# Patient Record
Sex: MALE | Race: ASIAN | NOT HISPANIC OR LATINO | Employment: FULL TIME | URBAN - METROPOLITAN AREA
[De-identification: names, ages, dates, MRNs, and addresses within clinical notes are randomized per-mention and may not be internally consistent; named-entity substitution may affect disease eponyms.]

---

## 2017-11-27 ENCOUNTER — OFFICE VISIT - HEALTHEAST (OUTPATIENT)
Dept: FAMILY MEDICINE | Facility: CLINIC | Age: 37
End: 2017-11-27

## 2017-11-27 DIAGNOSIS — Z23 NEED FOR INFLUENZA VACCINATION: ICD-10-CM

## 2017-11-27 DIAGNOSIS — M54.9 BACKACHE: ICD-10-CM

## 2017-11-27 ASSESSMENT — MIFFLIN-ST. JEOR: SCORE: 1527.98

## 2018-06-05 ENCOUNTER — OFFICE VISIT - HEALTHEAST (OUTPATIENT)
Dept: FAMILY MEDICINE | Facility: CLINIC | Age: 38
End: 2018-06-05

## 2018-06-05 DIAGNOSIS — R09.82 POST-NASAL DRIP: ICD-10-CM

## 2018-06-05 DIAGNOSIS — R05.9 COUGH: ICD-10-CM

## 2018-06-05 ASSESSMENT — MIFFLIN-ST. JEOR: SCORE: 1532.47

## 2020-09-27 ENCOUNTER — COMMUNICATION - HEALTHEAST (OUTPATIENT)
Dept: SCHEDULING | Facility: CLINIC | Age: 40
End: 2020-09-27

## 2020-09-28 ENCOUNTER — COMMUNICATION - HEALTHEAST (OUTPATIENT)
Dept: FAMILY MEDICINE | Facility: CLINIC | Age: 40
End: 2020-09-28

## 2020-10-13 ENCOUNTER — OFFICE VISIT - HEALTHEAST (OUTPATIENT)
Dept: FAMILY MEDICINE | Facility: CLINIC | Age: 40
End: 2020-10-13

## 2020-10-13 DIAGNOSIS — N45.3 EPIDIDYMOORCHITIS: ICD-10-CM

## 2020-10-13 ASSESSMENT — MIFFLIN-ST. JEOR: SCORE: 1556.83

## 2020-10-14 ENCOUNTER — COMMUNICATION - HEALTHEAST (OUTPATIENT)
Dept: NURSING | Facility: CLINIC | Age: 40
End: 2020-10-14

## 2020-10-15 ENCOUNTER — COMMUNICATION - HEALTHEAST (OUTPATIENT)
Dept: CARE COORDINATION | Facility: CLINIC | Age: 40
End: 2020-10-15

## 2020-10-19 ENCOUNTER — COMMUNICATION - HEALTHEAST (OUTPATIENT)
Dept: NURSING | Facility: CLINIC | Age: 40
End: 2020-10-19

## 2020-10-19 ASSESSMENT — ACTIVITIES OF DAILY LIVING (ADL): DEPENDENT_IADLS:: INDEPENDENT

## 2020-10-23 ENCOUNTER — COMMUNICATION - HEALTHEAST (OUTPATIENT)
Dept: CARE COORDINATION | Facility: CLINIC | Age: 40
End: 2020-10-23

## 2020-11-04 ENCOUNTER — COMMUNICATION - HEALTHEAST (OUTPATIENT)
Dept: CARE COORDINATION | Facility: CLINIC | Age: 40
End: 2020-11-04

## 2020-11-18 ENCOUNTER — COMMUNICATION - HEALTHEAST (OUTPATIENT)
Dept: NURSING | Facility: CLINIC | Age: 40
End: 2020-11-18

## 2020-11-30 ENCOUNTER — OFFICE VISIT - HEALTHEAST (OUTPATIENT)
Dept: FAMILY MEDICINE | Facility: CLINIC | Age: 40
End: 2020-11-30

## 2020-11-30 DIAGNOSIS — Z00.00 ROUTINE GENERAL MEDICAL EXAMINATION AT A HEALTH CARE FACILITY: ICD-10-CM

## 2020-11-30 LAB — HBA1C MFR BLD: 5.2 %

## 2020-11-30 ASSESSMENT — MIFFLIN-ST. JEOR: SCORE: 1581.57

## 2020-12-01 LAB
HBV SURFACE AG SERPL QL IA: NEGATIVE
HIV 1+2 AB+HIV1 P24 AG SERPL QL IA: NEGATIVE
LDLC SERPL CALC-MCNC: 153 MG/DL

## 2020-12-02 ENCOUNTER — COMMUNICATION - HEALTHEAST (OUTPATIENT)
Dept: FAMILY MEDICINE | Facility: CLINIC | Age: 40
End: 2020-12-02

## 2020-12-02 LAB — HCV AB SERPL QL IA: NEGATIVE

## 2020-12-15 ENCOUNTER — COMMUNICATION - HEALTHEAST (OUTPATIENT)
Dept: NURSING | Facility: CLINIC | Age: 40
End: 2020-12-15

## 2020-12-17 ENCOUNTER — COMMUNICATION - HEALTHEAST (OUTPATIENT)
Dept: CARE COORDINATION | Facility: CLINIC | Age: 40
End: 2020-12-17

## 2020-12-28 ENCOUNTER — COMMUNICATION - HEALTHEAST (OUTPATIENT)
Dept: CARE COORDINATION | Facility: CLINIC | Age: 40
End: 2020-12-28

## 2021-01-06 ENCOUNTER — COMMUNICATION - HEALTHEAST (OUTPATIENT)
Dept: CARE COORDINATION | Facility: CLINIC | Age: 41
End: 2021-01-06

## 2021-01-13 ENCOUNTER — COMMUNICATION - HEALTHEAST (OUTPATIENT)
Dept: CARE COORDINATION | Facility: CLINIC | Age: 41
End: 2021-01-13

## 2021-01-14 ENCOUNTER — COMMUNICATION - HEALTHEAST (OUTPATIENT)
Dept: NURSING | Facility: CLINIC | Age: 41
End: 2021-01-14

## 2021-01-15 ENCOUNTER — COMMUNICATION - HEALTHEAST (OUTPATIENT)
Dept: CARE COORDINATION | Facility: CLINIC | Age: 41
End: 2021-01-15

## 2021-01-15 DIAGNOSIS — N39.0 E. COLI UTI: ICD-10-CM

## 2021-01-15 DIAGNOSIS — N20.0 CALCULUS OF KIDNEY: ICD-10-CM

## 2021-01-15 DIAGNOSIS — N45.3 EPIDIDYMOORCHITIS: ICD-10-CM

## 2021-01-15 DIAGNOSIS — B96.20 E. COLI UTI: ICD-10-CM

## 2021-01-15 DIAGNOSIS — D72.829 LEUKOCYTOSIS (LEUCOCYTOSIS): ICD-10-CM

## 2021-03-15 ENCOUNTER — COMMUNICATION - HEALTHEAST (OUTPATIENT)
Dept: CARE COORDINATION | Facility: CLINIC | Age: 41
End: 2021-03-15

## 2021-03-15 ENCOUNTER — COMMUNICATION - HEALTHEAST (OUTPATIENT)
Dept: NURSING | Facility: CLINIC | Age: 41
End: 2021-03-15

## 2021-03-15 DIAGNOSIS — D72.829 LEUKOCYTOSIS (LEUCOCYTOSIS): ICD-10-CM

## 2021-03-15 DIAGNOSIS — B96.20 E. COLI UTI: ICD-10-CM

## 2021-03-15 DIAGNOSIS — N20.0 CALCULUS OF KIDNEY: ICD-10-CM

## 2021-03-15 DIAGNOSIS — N45.3 EPIDIDYMOORCHITIS: ICD-10-CM

## 2021-03-15 DIAGNOSIS — N39.0 E. COLI UTI: ICD-10-CM

## 2021-05-31 VITALS — WEIGHT: 141.75 LBS | HEIGHT: 68 IN | BODY MASS INDEX: 21.48 KG/M2

## 2021-06-01 VITALS — WEIGHT: 143.5 LBS | HEIGHT: 68 IN | BODY MASS INDEX: 21.75 KG/M2

## 2021-06-05 VITALS
HEART RATE: 90 BPM | TEMPERATURE: 98.5 F | RESPIRATION RATE: 14 BRPM | SYSTOLIC BLOOD PRESSURE: 120 MMHG | DIASTOLIC BLOOD PRESSURE: 76 MMHG | BODY MASS INDEX: 22.55 KG/M2 | WEIGHT: 148.8 LBS | HEIGHT: 68 IN | OXYGEN SATURATION: 95 %

## 2021-06-05 VITALS
SYSTOLIC BLOOD PRESSURE: 118 MMHG | OXYGEN SATURATION: 99 % | HEART RATE: 89 BPM | TEMPERATURE: 98.2 F | BODY MASS INDEX: 22.96 KG/M2 | WEIGHT: 151.5 LBS | HEIGHT: 68 IN | DIASTOLIC BLOOD PRESSURE: 66 MMHG

## 2021-06-12 NOTE — PROGRESS NOTES
ASSESMENT AND PLAN:  Diagnoses and all orders for this visit:    Epididymoorchitis  Reviewed the discharge summary with the patient with the help of a professional .  He has completed his full course of antibiotics and has had almost complete resolution of his symptoms.  Counseled the patient that if he does not have complete resolution of the remaining low-grade testicular and abdominal pain that it should be rechecked with me here in the clinic in 3 to 4 weeks.  Patient in agreement with the plan, because of the concern about an insurance and his recent hospitalization and confusion about insurance coverage as detailed below I would like to have him be referred to our care management team to have assistance from a financial .  -     Ambulatory referral to Care Management (Primary Care)        Reviewed the risks and benefits of the treatment plan with the patient and/or caregiver and we discussed indications for routine and emergent follow-up.        SUBJECTIVE: 40-year-old male hospitalized recently and diagnosed with epididymal orchitis of the left side.  Labs showed urine culture positive for E. coli, gonorrhea and Chlamydia testing was negative.  Patient was treated with aggressive antibiotics initially with IV therapy and then transition to oral and discharged home.  Since discharge, he has not had any vomiting.  He had some subjective fever the first 2 days after discharge but this has subsequently resolved.  His pain has continued to improve and he reports there is been about a 90% reduction in pain in his left testicle and in his abdomen.  Patient reports that there have been recent problems with health insurance and apparently he does not currently have active health insurance.    No past medical history on file.  Patient Active Problem List   Diagnosis     h/o kidney stone     Epididymoorchitis     E. coli UTI     Leukocytosis (leucocytosis)     Current Outpatient Medications  "  Medication Sig Dispense Refill     acetaminophen (TYLENOL) 500 MG tablet Take 1,000 mg by mouth every 6 (six) hours as needed for pain.       No current facility-administered medications for this visit.      Social History     Tobacco Use   Smoking Status Former Smoker     Quit date: 2012     Years since quittin.8   Smokeless Tobacco Never Used       OBJECTICE: /76   Pulse 90   Temp 98.5  F (36.9  C) (Oral)   Resp 14   Ht 5' 7.52\" (1.715 m)   Wt 148 lb 12.8 oz (67.5 kg)   SpO2 95%   BMI 22.95 kg/m       No results found for this or any previous visit (from the past 24 hour(s)).     GEN-alert, appropriate, in no apparent distress   CV-regular rate and rhythm with no murmur   RESP-lungs clear to auscultation   ABDOMINAL-soft, diffuse mild tenderness to deep palpation of the lower abdomen.   Genitourinary-penis shows changes consistent with previous silicone injection, otherwise normal external genitalia.  Mildly tender to palpation of the left testicle and epididymis.  No testicular nodules or masses.    Sawyer Hansen          "

## 2021-06-12 NOTE — PROGRESS NOTES
Clinic Care Coordination Contact  Community Health Worker Initial Outreach    CHW Initial Information Gathering:  Referral Source: PCP  Preferred Hospital: [facility]  [phone]  Preferred Urgent Care: [facility], [phone]  Current living arrangement:: I live in a private home with family  Type of residence:: Private home - stairs  Community Resources: None  Supplies Currently Used at Home: None  Equipment Currently Used at Home: none  Informal Support system:: Friends, Family  No PCP office visit in Past Year: No  Transportation means:: Regular car  CHW Additional Questions  MyChart active?: No  Patient agreeable to assistance with activating MyChart?: No    Patient accepts CC: Yes. Patient scheduled for assessment with CCC SW on Monday 10/19 at 1pm. Patient noted desire to discuss other possible concerns besides insurance.     CHW sent Remind Me message to FRW today      FRW SCREENING:    What does the patient need help with?: Health Insurance     Answer the screening questions below      Screening Questions:   1. Have you recently applied recently or are you do for a renewal? If so, when? -  No  2. How many people in your household? -  2 Adults, 4 children  3. Do you file taxes, who do you claim? -   couple  4. What is the monthly gross income for the household (wages, social security, workers comp, and pension)? -  $3,000 per month combined income of Edmundo Fraga and his wife Dori Taylor-its     PMI#: [id number]    Major Programs  This subscriber is eligible for FF: [place].  Elig Type M2: [place] group II  Eligibility Begin Date: 11/01/2019  Eligibility End Date: 12/31/2019    Prepaid Health Plan  This subscriber receives FF01 - [place] delivered through [employer]. The phone numbers is [phone] ().    Any other information for FRW? - He reports he cannot get insurance through his employer. His wife has insurance through her  employer, so unsure if she can add him to her insurance???

## 2021-06-12 NOTE — PROGRESS NOTES
Clinic Care Coordination Contact  Program: Health Insurance   County:  [facility] Case #:  [facility] Worker:   Nguyen #:   PMI #:   Date Applied:   Renewal Month:       Outreach:   10/23/20: FRW and patient called [facility] to update name. Name has been updated and Blue Plus card will be coming in mail. Moving outreach to 2 weeks. FRW updated action steps in financial wellbeing goals.   10/15/20: FRW called patient and discussed referral. Patient has [place] but the name is incorrect on his insurance card. FRW and patient will call [facility] on 10/23 to fix name.   Referral/Screening:   Hi,     Edmundo Fraga would like help applying for health insurance.         What does the patient need help with?: Health Insurance     Answer the screening questions below     Screening Questions:   1. Have you recently applied recently or are you do for a renewal? If so, when? -  No   2. How many people in your household? -  2 Adults, 4 children   3. Do you file taxes, who do you claim? -   couple   4. What is the monthly gross income for the household (wages, social security, workers comp, and pension)? -  $3,000 per month combined income of Edmundo Fraga and his wife Dori Taylor-its     PMI#: [id number]     Major Programs   This subscriber is eligible for FF: [place] [facility].   Elig Type M2: [place] group II   Eligibility Begin Date: 11/01/2019   Eligibility End Date: 12/31/2019     Prepaid Health Plan   This subscriber receives FF01 - [place] delivered through [employer]. The phone numbers is [phone] ([phone]).     Any other information for FRW? - He reports he cannot get insurance through his employer. His wife has insurance through her employer, so unsure if she can add him to her insurance???         Thank you,     Emily       Goals Addressed:   Goals Addressed                 This Visit's Progress       Patient Stated      Financial Wellbeing (pt-stated)   50%     Goal statement: I would like to have active  health insurance and resolve any outstanding account balance within the next 90 days.    Date goal set: 10/19/20  Barriers: Language barrier, lack of health insurance, needs follow up with PCP  Strengths: Appears willing to accept support, engage with CCC team members  Date to achieve by: 1/19/21  Patient expressed understanding of goal: Yes    Action steps to achieve this goal:  1.  I updated my name on 10/23 with [facility]. I will get a call from W in 2 weeks to make sure I receive my card and bills are reprocessed.   2.  I will provide all necessary paperwork/documentation to assist in this process.

## 2021-06-12 NOTE — PROGRESS NOTES
Clinic Care Coordination Contact  Program: Health Insurance   County:  [facility] Case #:  [facility] Worker:   Nguyen #:   PMI #:   Date Applied:   Renewal Month:       Outreach:   12/7/20: FRW received a message from Dr. Hansen that patient was receiving letters from [employer] stating they won't be paying for claims. FRW called patient. Patient is going to call [employer] billing as they have representatives that work directly with [employer] to get claims paid. Patient has number. This is not a FRW task. Removing patient from panel.  11/4/20: FRW called patient. Patient received his insurance card with correct spelling. FRW checked [place], insurance is [place]. Insurance is active in Epic and patient has 0.00 balance. FRW completed financial wellbeing goal and routed note to CC team for standard of work.         This subscriber is eligible for FF: [place].      Elig Type M2: [place] group II      Eligibility Begin Date: 11/01/2020      Eligibility End Date: --/--/----      This subscriber is eligible for the following service types: Medical Care ,  Chiropractic ,  Dental Care ,  Hospital ,  Hospital - Inpatient ,  Hospital - Outpatient ,  Emergency Services ,  Pharmacy ,  Professional (Physician) Visit - Office ,  Vision (Optometry) ,  Mental Health ,  Urgent Care    Prepaid Health Plan        This subscriber receives FF01 - [place] delivered through [employer]. The phone numbers is [phone] ().    10/23/20: FRW and patient called [facility] to update name. Name has been updated and Blue Plus card will be coming in mail. Moving outreach to 2 weeks. FRW updated action steps in financial wellbeing goals.   10/15/20: FRW called patient and discussed referral. Patient has [place] but the name is incorrect on his insurance card. FRW and patient will call [facility] on 10/23 to fix name.   Referral/Screening:   Edmundo Lafleur would like help applying for health insurance.         What does the  patient need help with?: Health Insurance     Answer the screening questions below     Screening Questions:   1. Have you recently applied recently or are you do for a renewal? If so, when? -  No   2. How many people in your household? -  2 Adults, 4 children   3. Do you file taxes, who do you claim? -   couple   4. What is the monthly gross income for the household (wages, social security, workers comp, and pension)? -  $3,000 per month combined income of Edmundo Fraga and his wife Dori Fraga       [place]-its     PMI#: [id number]     Major Programs   This subscriber is eligible for FF: [place].   Elig Type M2: [place] group II   Eligibility Begin Date: 11/01/2019   Eligibility End Date: 12/31/2019     Prepaid Health Plan   This subscriber receives FF01 - [place] delivered through [employer]. The phone numbers is [phone] ().     Any other information for FRW? - He reports he cannot get insurance through his employer. His wife has insurance through her employer, so unsure if she can add him to her insurance???         Thank you,     Emily       Goals Addressed:   Goals Addressed      Goals Addressed                 This Visit's Progress       Patient Stated      COMPLETED: Financial Wellbeing (pt-stated)        Goal statement: I would like to have active health insurance and resolve any outstanding account balance within the next 90 days.    Date goal set: 10/19/20  Barriers: Language barrier, lack of health insurance, needs follow up with PCP  Strengths: Appears willing to accept support, engage with CCC team members  Date to achieve by: 1/19/21  Patient expressed understanding of goal: Yes    Action steps to achieve this goal:  1.  I have received my updated insurance card and have a 0.00 balance with [employer].

## 2021-06-12 NOTE — PROGRESS NOTES
Clinic Care Coordination Contact  Program: Health Insurance   County:  [facility] Case #:  [facility] Worker:   Nguyen #:   PMI #:   Date Applied:   Renewal Month:       Outreach:   10/15/20: FRW called patient and discussed referral. Patient has [place] but the name is incorrect on his insurance card. FRW and patient will call [facility] on 10/23 to fix name.   Referral/Screening:   HiEdmundo would like help applying for health insurance.         What does the patient need help with?: Health Insurance     Answer the screening questions below     Screening Questions:   1. Have you recently applied recently or are you do for a renewal? If so, when? -  No   2. How many people in your household? -  2 Adults, 4 children   3. Do you file taxes, who do you claim? -   couple   4. What is the monthly gross income for the household (wages, social security, workers comp, and pension)? -  $3,000 per month combined income of Edmundo Fraga and his wife Dori Fraga       [place]its     PMI#: [id number]     Major Programs   This subscriber is eligible for FF: [place].   Elig Type M2: [place] group II   Eligibility Begin Date: 11/01/2019   Eligibility End Date: 12/31/2019     Prepaid Health Plan   This subscriber receives FF01 - [place] delivered through [employer]. The phone numbers is [phone] ().     Any other information for FRW? - He reports he cannot get insurance through his employer. His wife has insurance through her employer, so unsure if she can add him to her insurance???         Thank you,     Emily       Goals Addressed:

## 2021-06-13 NOTE — PROGRESS NOTES
Clinic Care Coordination Contact  Program: Write off claims  County:  [facility] Case #:  [facility] Worker:   Nguyen #:   PMI #:   Date Applied:   Renewal Month:       Outreach:   12/17/20: FRW sent Kate Vann in billing to [employer]. FRW will make outreach to patient in 1 week with update.   12/7/20: FRW received a message from Dr. Hansen that patient was receiving letters from [employer] stating they won't be paying for claims. FRW called patient. Patient is going to call [employer] billing as they have representatives that work directly with [employer] to get claims paid. Patient has number. This is not a FRW task. Removing patient from panel.  11/4/20: FRW called patient. Patient received his insurance card with correct spelling. FRW checked [place], insurance is active. Insurance is active in Epic and patient has 0.00 balance. FRW completed financial wellbeing goal and routed note to CC team for standard of work.         This subscriber is eligible for FF: [place].      Elig Type M2: [facility] group [place]      Eligibility Begin Date: 11/01/2020      Eligibility End Date: --/--/----      This subscriber is eligible for the following service types: Medical Care ,  Chiropractic ,  Dental Care ,  Hospital ,  Hospital - Inpatient ,  Hospital - Outpatient ,  Emergency Services ,  Pharmacy ,  Professional (Physician) Visit - Office ,  Vision (Optometry) ,  Mental Health ,  Urgent Care    Prepaid Health Plan        This subscriber receives FF01 - [place] delivered through [employer]. The phone numbers is [phone] ().    10/23/20: FRW and patient called [facility] to update name. Name has been updated and Blue Plus card will be coming in mail. Moving outreach to 2 weeks. FRW updated action steps in financial wellbeing goals.   10/15/20: FRW called patient and discussed referral. Patient has [place] but the name is incorrect on his insurance card. FRW and patient will call [facility] on 10/23 to  fix name.   Referral/Screening:   Edmundo Lafleur would like help applying for health insurance.         What does the patient need help with?: Health Insurance     Answer the screening questions below     Screening Questions:   1. Have you recently applied recently or are you do for a renewal? If so, when? -  No   2. How many people in your household? -  2 Adults, 4 children   3. Do you file taxes, who do you claim? -   couple   4. What is the monthly gross income for the household (wages, social security, workers comp, and pension)? -  $3,000 per month combined income of Edmundo Fraga and his wife Dori Fraga       Mn-its     PMI#: [id number]     Major Programs   This subscriber is eligible for FF: [place].   Elig Type M2: [place] group II   Eligibility Begin Date: 11/01/2019   Eligibility End Date: 12/31/2019     Prepaid Health Plan   This subscriber receives FF01 - [place] delivered through [employer]. The phone numbers is [phone] ().     Any other information for FRW? - He reports he cannot get insurance through his employer. His wife has insurance through her employer, so unsure if she can add him to her insurance???         Thank you,     Emily       Goals Addressed:   Goals Addressed      Goals Addressed                 This Visit's Progress       Patient Stated      COMPLETED: Financial Wellbeing (pt-stated)        Goal statement: I would like to have active health insurance and resolve any outstanding account balance within the next 90 days.    Date goal set: 10/19/20  Barriers: Language barrier, lack of health insurance, needs follow up with PCP  Strengths: Appears willing to accept support, engage with CCC team members  Date to achieve by: 1/19/21  Patient expressed understanding of goal: Yes    Action steps to achieve this goal:  1.  I have received my updated insurance card and have a 0.00 balance with [employer].

## 2021-06-13 NOTE — PROGRESS NOTES
Clinic Care Coordination Contact    Community Health Worker Follow Up    Goals:     Goals Addressed                 This Visit's Progress       Patient Stated      Financial Wellbeing (pt-stated)        Goal Statement: I want help resolving my old balance that occurred before my [place] insurance became effective.    Date Goal set: 12/15/2020  Barriers: Unfamiliar with managing medical bills  Strengths: Engaged and open for help  Date to Achieve By: 01/15/2021  Patient expressed understanding of goal: Yes    Action steps to achieve this goal:  1. I will answer my phone when the FRW calls to address my old balance.  2. I will provide any information needed to help resolve my old account balance.  3. I will call the FRW if I have questions or concerns.          COMPLETED: Medical (pt-stated)   100%     Goal statement: I will schedule and attend a follow up appointment with my PCP within the next 45 days.     Date goal set: 10/19/20  Barriers: Language barrier, lack of health insurance, needs follow up with PCP  Strengths: Appears willing to accept support, engage with CCC team members  Date to achieve by: 12/1/20  Patient expressed understanding of goal: Yes    Personal Plan:  1. I will follow up with my PCP as needed and complete my next annual physical November 2021.                CHW Outreach Conversation:    Edmundo Fraga still has an old balance and per FRW notes it sounded like the FRW could not assist but the CHW contacted the billing department and they state that the patient just needs help writing off the old balance as the claims occurred prior to the [place] insurance becoming active. If for any reason the billing department will not write off this old balance then the patient would like support with a Financial Aid [facility] application to help resolve the balance.    CHW created a new goal and sent a Remind Me message to the FRW to follow up with the billing department on this old  balance as [place] does not back pay.      CHW Next Follow-up: 1 month    CHW Plan: Review FRW notes and account balance for updates

## 2021-06-13 NOTE — PROGRESS NOTES
ASSESMENT AND PLAN:    Physical, Health Maitenence -   Reviewed healthy lifestyle, diet, exercise, vitamins, and follow-up plan today with patient.  Reviewed age appropriate cancer and other screening recommendations.  Immunization review and update done.  Reviewed indicated lab tests, see lab orders.   Financial  to help with concerns as detailed below.      -     HIV Antigen/Antibody Screening [facility]  -     Hepatitis C Antibody (Anti-HCV)  -     Hepatitis B Surface Antigen (HBsAG)  -     Glycosylated Hemoglobin A1c  -     LDL Cholesterol, Direct  -     Tdap vaccine,  8yo or older,  IM          HPI: 40-year-old male here for his annual preventative visit and physical.  No other concerns.  The patient had recently been hospitalized with epididymaorchitis.  He reports that his symptoms have resolved completely.  No abdominal pain, no testicular pain, no dysuria or gross hematuria.  Gonorrhea and Chlamydia testing were negative at that time.  He does get letters from the insurance indicating that they are denying payment for his hospitalization and follow-up care, he is under care management and so I am going to forward those concerns to our financial .  Patient is feeling financial stress related to these letters and hospital/clinic bills.    ROS: No chest pain, no shortness of breath, no blood in the urine, no blood in the stool, no skin lesions that have been changing, remainder of review of systems is as above or negative.    Healthy Habits  Are you taking a daily aspirin? No  Do you typically exercising at least 40 min, 3-4 times per week?  NO  Do you usually eat at least 4 servings of fruit and vegetables a day, include whole grains and fiber and avoid regularly eating high fat foods? Yes  Have you had an eye exam in the past two years? Yes  Do you see a dentist twice per year? NO  Do you have any concerns regarding sleep? No    Safety Screen  If you own firearms, are they secured in  a locked gun cabinet or with trigger locks? Yes  Do you feel you are safe where you are living?: Yes (10/13/2020  4:52 PM)  Do you feel you are safe in your relationship(s)?: Yes (10/13/2020  4:52 PM)        Current Outpatient Medications   Medication Sig Dispense Refill     acetaminophen (TYLENOL) 500 MG tablet Take 1,000 mg by mouth every 6 (six) hours as needed for pain.       No current facility-administered medications for this visit.        Patient Active Problem List   Diagnosis     h/o kidney stone     Epididymoorchitis     E. coli UTI     Leukocytosis (leucocytosis)       Social History     Socioeconomic History     Marital status:      Spouse name: Camilla Fraga     Number of children: 4     Years of education: None     Highest education level: None   Occupational History     None   Social Needs     Financial resource strain: Somewhat hard     Food insecurity     Worry: Never true     Inability: Never true     Transportation needs     Medical: No     Non-medical: No   Tobacco Use     Smoking status: Former Smoker     Quit date: 2012     Years since quittin.0     Smokeless tobacco: Never Used   Substance and Sexual Activity     Alcohol use: Yes     Alcohol/week: 2.0 standard drinks     Types: 2 Cans of beer per week     Frequency: 2-4 times a month     Drinks per session: 1 or 2     Binge frequency: Never     Drug use: No     Sexual activity: Yes     Partners: Female   Lifestyle     Physical activity     Days per week: 5 days     Minutes per session: 30 min     Stress: Only a little   Relationships     Social connections     Talks on phone: More than three times a week     Gets together: More than three times a week     Attends [employer] service: More than 4 times per year     Active member of club or organization: No     Attends meetings of clubs or organizations: Never     Relationship status:      Intimate partner violence     Fear of current or ex partner: No     Emotionally abused: No  "    Physically abused: No     Forced sexual activity: No   Other Topics Concern     None   Social History Narrative     None       Social History     Tobacco Use   Smoking Status Former Smoker     Quit date: 2012     Years since quittin.0   Smokeless Tobacco Never Used       OBJECTICE: /66 (Patient Site: Right Arm, Patient Position: Sitting, Cuff Size: Adult Regular)   Pulse 89   Temp 98.2  F (36.8  C) (Oral)   Ht 5' 8.31\" (1.735 m)   Wt 151 lb 8 oz (68.7 kg)   SpO2 99%   BMI 22.83 kg/m        Gen - alert, orientated, NAD  Eyes - fundascopic exam limited by the undialated pupil but looks symmetric  ENT - oropharynx clear, TMs clear  Neck - supple, no palpable mass or lymphadenopathy  CV - RRR, no murmur  Resp - lungs CTA  Ab - soft, nontender, no palpable mass or organomegaly   - normal appearance to the external genetalia except for sequelae from previous penile injection, normal testicular exam bilaterally, no hernia  Extrem - warm, no edema  Neuro - CN II-XII intact, strength, sensation, reflexes intact and symmetric  Skin - no rash, no atypical appearing lesions seen.           Sawyer Hansen   7:05 PM 2020                 "

## 2021-06-13 NOTE — PROGRESS NOTES
Clinic Care Coordination Contact    Community Health Worker Follow Up    Goals:     Goals Addressed                 This Visit's Progress       Patient Stated      Medical (pt-stated)   70%     Goal statement: I will schedule and attend a follow up appointment with my PCP within the next 45 days.     Date goal set: 10/19/20  Barriers: Language barrier, lack of health insurance, needs follow up with PCP  Strengths: Appears willing to accept support, engage with CCC team members  Date to achieve by: 12/1/20  Patient expressed understanding of goal: Yes    Action steps to achieve this goal:  1.  I will attend my Annual Physical on Monday 11/30 at 5:20pm.  2.  I will call my CHW if I need to reschedule this appointment.                CHW Outreach Conversation:    Edmundo Lay reports that he is aware his health insurance is active and is okay to schedule an Annual Physical with Dr. Hansen.      Possible Care Gaps to Address:    Annual Physical      CHW Next Follow-up: 12/16/2020    CHW Plan: Review Annual Physical office visit notes. Create new goals if needed.

## 2021-06-14 NOTE — PROGRESS NOTES
Clinic Care Coordination Contact    Community Health Worker Follow Up    Goals:     Goals Addressed                 This Visit's Progress       Patient Stated      COMPLETED: Financial Wellbeing (pt-stated)   100%     Goal Statement: I would like assistance resolving my old balance that occurred before my [employer] insurance became effective within the next 60 days.    Date Goal set: 12/15/20  Barriers: Unfamiliar with managing medical bills  Strengths: Engaged and open for help  Date to Achieve By: 2/15/21  Patient expressed understanding of goal: Yes    Personal Plan:  1. My account balance is resolved and I will contact the billing department at [phone] if I receive a bill in the mail that I have questions about.                CHW Outreach Conversation:    Edmundo Fraga has no account balance currently and his insurance is still showing as active with [employer]. He understands he will have copays and is responsible for paying these.    No new concerns at this time and is okay with moving to Maintenance.      CHW Next Follow-up: 2 months    CHW Plan: Routing to [facility] to review for Maintenance; CHW will outreach in 2 months to review for Graduation.

## 2021-06-14 NOTE — PROGRESS NOTES
Clinic Care Coordination Contact  Program: Write off claims  County:  [facility] Case #:  [facility] Worker:   Nguyen #:   PMI #:   Date Applied:   Renewal Month:       Outreach:   12/28/20: FRW re-sent Kate Vann in billing to W/O balance. FRW will make outreach to patient in 1 week with update.     12/17/20: FRW sent Kate Vann in billing to W/O balance. FRW will make outreach to patient in 1 week with update.   12/7/20: FRW received a message from Dr. Hansen that patient was receiving letters from [employer] stating they won't be paying for claims. FRW called patient. Patient is going to call [employer] billing as they have representatives that work directly with [employer] to get claims paid. Patient has number. This is not a FRW task. Removing patient from panel.  11/4/20: FRW called patient. Patient received his insurance card with correct spelling. FRW checked [place], [place] is [place]. Insurance is active in Epic and patient has 0.00 balance. FRW completed financial wellbeing goal and routed note to CC team for standard of work.         This subscriber is eligible for FF: [place] [facility].      Elig Type M2: [place] group II      Eligibility Begin Date: 11/01/2020      Eligibility End Date: --/--/----      This subscriber is eligible for the following service types: Medical Care ,  Chiropractic ,  Dental Care ,  Hospital ,  Hospital - Inpatient ,  Hospital - Outpatient ,  Emergency Services ,  Pharmacy ,  Professional (Physician) Visit - Office ,  Vision (Optometry) ,  Mental Health ,  Urgent Care    Prepaid Health Plan        This subscriber receives FF01 - [place] delivered through [employer]. The phone numbers is [phone] ().    10/23/20: FRW and patient called [facility] to update name. Name has been updated and Blue Plus card will be coming in mail. Moving outreach to 2 weeks. FRW updated action steps in financial wellbeing goals.   10/15/20: FRW called patient and discussed  referral. Patient has [place] but the name is incorrect on his insurance card. FRW and patient will call [facility] on 10/23 to fix name.   Referral/Screening:   HiEdmundo would like help applying for health insurance.         What does the patient need help with?: Health Insurance     Answer the screening questions below     Screening Questions:   1. Have you recently applied recently or are you do for a renewal? If so, when? -  No   2. How many people in your household? -  2 Adults, 4 children   3. Do you file taxes, who do you claim? -   couple   4. What is the monthly gross income for the household (wages, social security, workers comp, and pension)? -  $3,000 per month combined income of Edmundo Fraga and his wife Dori Fraga       [place]     PMI#: [id number]     Major Programs   This subscriber is eligible for FF: [place] [facility].   Elig Type M2: [place] group II   Eligibility Begin Date: 11/01/2019   Eligibility End Date: 12/31/2019     Prepaid Health Plan   This subscriber receives FF01 - [place] delivered through [employer]. The phone numbers is [phone] ().     Any other information for FRW? - He reports he cannot get insurance through his employer. His wife has insurance through her employer, so unsure if she can add him to her insurance???         Thank you,     Emily       Goals Addressed:   Goals Addressed      Goals Addressed                 This Visit's Progress       Patient Stated      COMPLETED: Financial Wellbeing (pt-stated)        Goal statement: I would like to have active health insurance and resolve any outstanding account balance within the next 90 days.    Date goal set: 10/19/20  Barriers: Language barrier, lack of health insurance, needs follow up with PCP  Strengths: Appears willing to accept support, engage with CCC team members  Date to achieve by: 1/19/21  Patient expressed understanding of goal: Yes    Action steps to achieve this goal:  1.  I have received my  updated insurance card and have a 0.00 balance with [employer].

## 2021-06-14 NOTE — PROGRESS NOTES
Clinic Care Coordination Contact     Situation: Pt chart reviewed by SW care coordinator.     Background:   - Most recent SW assessment completed on 10/19/20.  - Pt initially enrolled for FRW assistance due to lack of health insurance.   - See SW note dated 1/13/21 for further detail regarding course of CCC enrollment.  - Last outreach by CHW on 1/14/21. During this conversation, no new goals or concerns were identified. Pt's account balance is now $0. He has active insurance with [employer]. He understands he will have copays and is responsible for paying these.     Assessment: All previous goals completed. Enrollment status adjusted.      Plan/Recommendations:   1.) Pt will be transitioned to CCC maintenance at this time.   - If no new goals/concerns identified at next outreach in 2 months (on/around 3/15/21), CHW will route chart to CCC SW for graduation review.

## 2021-06-14 NOTE — PROGRESS NOTES
Clinic Care Coordination Contact  Program: Write off claims  County:  [facility] Case #:  [facility] Worker:   Nguyen #:   PMI #:   Date Applied:   Renewal Month:       Outreach:   1/6/21: FRW spoke with Seble Celestin in billing. Seble stated that all bills are with insurance now and are still being processed. FRW has no self-pay balance. If patient has self-pay balance in the future he can call the billing department at [employer]. FRW called patient to let him know insurance is processing but unable to leave . Routing note to CC team for standard of work.   12/28/20: FRW re-sent Kate Vann in billing to W/O balance. FRW will make outreach to patient in 1 week with update.     12/17/20: FRW sent Kate Vann in billing to W/O balance. FRW will make outreach to patient in 1 week with update.   12/7/20: FRW received a message from Dr. Hansen that patient was receiving letters from [employer] stating they won't be paying for claims. FRW called patient. Patient is going to call [employer] billing as they have representatives that work directly with [employer] to get claims paid. Patient has number. This is not a FRW task. Removing patient from panel.  11/4/20: FRW called patient. Patient received his insurance card with correct spelling. FRW checked [place], insurance is active. Insurance is active in Epic and patient has 0.00 balance. FRW completed financial wellbeing goal and routed note to CC team for standard of work.         This subscriber is eligible for FF: [facility].      Elig Type M2: [facility] group [facility]      Eligibility Begin Date: 11/01/2020      Eligibility End Date: --/--/----      This subscriber is eligible for the following service types: Medical Care ,  Chiropractic ,  Dental Care ,  Hospital ,  Hospital - Inpatient ,  Hospital - Outpatient ,  Emergency Services ,  Pharmacy ,  Professional (Physician) Visit - Office ,  Vision (Optometry) ,  Mental Health ,  Urgent Care    Prepaid Health Plan         This subscriber receives FF01 - [place] delivered through [employer]. The phone numbers is [phone] ().    10/23/20: FRW and patient called [facility] to update name. Name has been updated and Blue Plus card will be coming in mail. Moving outreach to 2 weeks. FRW updated action steps in financial wellbeing goals.   10/15/20: FRW called patient and discussed referral. Patient has [place] but the name is incorrect on his insurance card. FRW and patient will call [facility] on 10/23 to fix name.   Referral/Screening:   Hi,     Melindasirisha Fraga would like help applying for health insurance.         What does the patient need help with?: Health Insurance     Answer the screening questions below     Screening Questions:   1. Have you recently applied recently or are you do for a renewal? If so, when? -  No   2. How many people in your household? -  2 Adults, 4 children   3. Do you file taxes, who do you claim? -   couple   4. What is the monthly gross income for the household (wages, social security, workers comp, and pension)? -  $3,000 per month combined income of Edmundo Fraga and his wife Dori Fraga       [place]     PMI#: [id number]     Major Programs   This subscriber is eligible for FF: [place] [facility].   Elig Type M2: [place] group II   Eligibility Begin Date: 11/01/2019   Eligibility End Date: 12/31/2019     Prepaid Health Plan   This subscriber receives FF01 - [place] delivered through [employer]. The phone numbers is [phone] ().     Any other information for FRW? - He reports he cannot get insurance through his employer. His wife has insurance through her employer, so unsure if she can add him to her insurance???         Thank you,     Emily       Goals Addressed:   Goals Addressed      Goals Addressed                 This Visit's Progress       Patient Stated      COMPLETED: Financial Wellbeing (pt-stated)        Goal statement: I would like to have active health insurance and  resolve any outstanding account balance within the next 90 days.    Date goal set: 10/19/20  Barriers: Language barrier, lack of health insurance, needs follow up with PCP  Strengths: Appears willing to accept support, engage with CCC team members  Date to achieve by: 1/19/21  Patient expressed understanding of goal: Yes    Action steps to achieve this goal:  1.  I have received my updated insurance card and have a 0.00 balance with [employer].

## 2021-06-14 NOTE — PROGRESS NOTES
"  Chief Complaint   Patient presents with     Back Pain         HPI:   Edmundo Fraga is a 37 y.o. male .with  c/o back pain since yesterday.  Occurred after carried an amplifier.  All the way across his back.  No radiation into legs.  Has had some back pain in the past.  Works as .  Missed work due to the back pain.  No fever but has felt a little chilled.  No medication taken.  Used some tiger balm.  No numbness in legs.  No urinary retention.  No fecal incontinence.    ROS:  A 10 point comprehensive review of systems was negative except as noted.     Medications:  Current Outpatient Prescriptions on File Prior to Visit   Medication Sig Dispense Refill     omeprazole (PRILOSEC) 20 MG capsule Take 1 capsule (20 mg total) by mouth daily. 30 capsule 11     No current facility-administered medications on file prior to visit.          Social History:  Social History   Substance Use Topics     Smoking status: Former Smoker     Quit date: 11/23/2012     Smokeless tobacco: Never Used     Alcohol use Yes      Comment: sometimes only         Physical Exam:   Vitals:    11/27/17 1916   BP: 106/78   Patient Site: Right Arm   Patient Position: Sitting   Cuff Size: Adult Regular   Pulse: 84   Temp: 98.8  F (37.1  C)   TempSrc: Oral   Weight: 141 lb 12 oz (64.3 kg)   Height: 5' 7.72\" (1.72 m)       GEN:  NAD  LUNGS:  Clear to auscultation without wheezing.  Normal effort.  HEART:  RRR without murmur, rub or gallop  BACK:  No pain to percussion over LS spine.  No tenderness to palpation over muscles.  NEURO:  DTR's: Patellar  L 2/4  R 2/4                                Achilles  L  2/4  R 2/4                  Motor:  Great Toe Flexion L 5/5  R 5/5                                                  Extension L 5/5  R 5/5                              Ankle Flexion L 5/5  R 5/5                                        Extension L 5/5  R 5/5                              Knee Flexion  L 5/5  R 5/5                              "           Extension  L 5/5  R 5/5                              Hip Abduction  L 5/5  R 5/5                                    Adduction   L 5/5  R 5/5                              Hip Flexion L 5/5  R 5/5                                     Extension L 5/5  R 5/5                  Sensation intact to light touch throughout both LE                   Straight leg raising negative BL          Assessment/Plan:    1. Backache  cyclobenzaprine (FLEXERIL) 5 MG tablet    naproxen (NAPROSYN) 500 MG tablet   2. Need for influenza vaccination  Influenza, Seasonal Quad, Preservative Free 36+ Months      Low back pain--without neurological signs or symptoms.  Likely muscle strain.  Medications as ordered.  Ice 10-15 minutes at least three times a day.  Stretching and ROM exercises.  Discussed warning signs.  F/U if no improvement.    Note given to be off work 11/27-11/29/17        The following portions of the patient's history were reviewed and updated as appropriate: allergies, current medications, past family history, past medical history, past social history, past surgical history and problem list.    Aida Dickerson MD      11/27/2017

## 2021-06-14 NOTE — PROGRESS NOTES
"Clinic Care Coordination Contact    Situation: 45 day chart review completed by SW care coordinator.    Background:   - Most recent  assessment completed on 10/19/20.  - Pt initially enrolled for FRW assistance due to lack of health insurance.   - CHW contacted billing department on 12/15/20 to help pt resolve outstanding account balance and sent FRW a Remind Me message requesting further assistance with having this written of and/or completing a [facility] application.   - FRW note in chart dated 1/6/21 states: \"FRW spoke with Seble Celestin in billing. Seble stated that all bills are with insurance now and are still being processed. FRW has no self-pay balance. If patient has self-pay balance in the future he can call the billing department at [facility]. FRW called patient to let him know insurance is processing but unable to leave . Routing note to CC team for standard of work.\"  - Pt was last seen by PCP for an annual physical on 11/30/20.  - Last outreach by CHW on 12/15/20. CHW will review account balance again at next outreach and route for maintenance review if appropriate.    Assessment: All goals reviewed and updated during this encounter. CCC SW will continue monitoring goal(s) and progress made towards goal completion. Chart review to be completed by SW every 45 days during continued course of CCC enrollment.     Plan/Recommendations:   1.) Continue scheduled outreach.  "

## 2021-06-15 NOTE — PROGRESS NOTES
Clinic Care Coordination Contact     Situation: Pt chart reviewed by  care coordinator.     Background:   - Most recent  assessment completed on 10/19/20.  - Moved to [facility] [place] on 1/15/21. See  note from this date for further detail regarding course of [facility] enrollment.  - Pt initially enrolled for FRW assistance due to lack of health insurance.   - See  note dated 1/13/21 for further detail regarding course of [facility] enrollment.  - Last outreach by CHW on 3/15/21. CHW confirmed that health insurance is still active. No new concerns and pt does not need to come back to see his provider until November 2021 for an annual physical.     Assessment: All previous goals completed. Episode resolved. Enrollment status adjusted. CCC team members removed from Care Team.     Plan/Recommendations:   1.) Pt will be graduated from [facility]] at this time.

## 2021-06-15 NOTE — PROGRESS NOTES
Clinic Care Coordination Contact     Patient has completed all goals with Clinic Care Coordination.     Please review the chart and confirm if Graduation is approved.     - CHW confirmed that health insurance is still active. No new concerns and does not need to come back to see his provider until November 2021 for an annual physical.

## 2021-06-16 PROBLEM — D72.829 LEUKOCYTOSIS (LEUCOCYTOSIS): Status: ACTIVE | Noted: 2020-09-28

## 2021-06-16 PROBLEM — N39.0 E. COLI UTI: Status: ACTIVE | Noted: 2020-09-28

## 2021-06-16 PROBLEM — N45.3 EPIDIDYMOORCHITIS: Status: ACTIVE | Noted: 2020-09-25

## 2021-06-16 PROBLEM — B96.20 E. COLI UTI: Status: ACTIVE | Noted: 2020-09-28

## 2021-06-18 NOTE — PROGRESS NOTES
"ASSESSMENT AND PLAN:  1. Post-nasal drip the inferior turbinate the right nostril is boggy however no discharge is noted today he does have a cough which is more prevalent when he lies down trial of cetirizine to be started side effects discussed.  He will call back within 7-10 days if his symptoms continue    2. Cough patient was concerned that he may have \"\" cancer because he has had a cough for months.  No weakness loss of weight headaches generally his health has been good assured him that his respiratory exam was unremarkable.          CHIEF COMPLAINT:  Chief Complaint   Patient presents with     Cough       HISTORY OF PRESENT ILLNESS:  Edmundo is a 37 y.o. male presenting with cough. For the past month, he has been experiencing a productive cough with yellow sputum. He notes that his cough worsens at nighttime. He has been taking Nyquil to suppress his cough. He denies known ill contacts. He has been able to eat well.     REVIEW OF SYSTEMS:   He denies fever and runny nose.   All other 10 point review of systems are negative.    PFSH:  Works as an [employer] . Pertinent past, family, social and medical history reviewed.     TOBACCO USE:  History   Smoking Status     Former Smoker     Quit date: 11/23/2012   Smokeless Tobacco     Never Used       VITALS:  Vitals:    06/05/18 1511   BP: 120/76   Patient Site: Left Arm   Patient Position: Sitting   Cuff Size: Adult Regular   Pulse: 83   Temp: 97.6  F (36.4  C)   TempSrc: Oral   SpO2: 98%   Weight: 143 lb 8 oz (65.1 kg)   Height: 5' 7.5\" (1.715 m)     Wt Readings from Last 3 Encounters:   06/05/18 143 lb 8 oz (65.1 kg)   11/27/17 141 lb 12 oz (64.3 kg)   11/23/15 135 lb 12 oz (61.6 kg)     Body mass index is 22.14 kg/(m^2).    PHYSICAL EXAM:  General: Alert, cooperative, no distress, appears stated age  Head: Normocephalic, without obvious abnormality, atraumatic  Eyes: PERRL, conjunctiva/cornea clear, EOM's intact, fundi benign, both eyes  Ears: Normal TM's and " external ear canals, both ears  Nose: Right inferior turbinate hypertrophy present. Mucosa is boggy  Throat: Lips, mucosa, and tongue normal; teeth and gums normal  Musculoskeletal: Back symmetric, no curvature, ROM normal, no CVA tenderness.  Lungs: Clear to auscultation bilaterally, respirations unlabored  Chest wall: No tenderness or deformity  Heart: Regular rate and rhythm, S1 and S2 normal, no murmur, rub, or gallop  CVS: No edema noted.   Abdomen: Soft, non tender, bowel sounds active all four quadrants, no masses, no organomegaly.  Neurologic: No tremor, no focal findings.    Psych: Oriented x3. Affect normal.     DATA REVIEWED:  Additional History from Old Records Summarized (2): None  Decision to Obtain Records (1): None  Radiology Tests Summarized or Ordered (1): None  Labs Reviewed or Ordered (1): None  Medicine Test Summarized or Ordered (1): None  Independent Review of EKG or X-RAY(2 each): None    The visit lasted a total of 13 minutes face to face with the patient. Over 50% of the time was spent counseling and educating the patient about cough.     IRodney, am scribing for and in the presence of, Dr. Galvez.    Ijorge personally performed the services described in this documentation, as scribed by Rodney Jason in my presence, and it is both accurate and complete.      MEDICATIONS:  Current Outpatient Prescriptions   Medication Sig Dispense Refill     naproxen (NAPROSYN) 500 MG tablet Take 1 tablet (500 mg total) by mouth 2 (two) times a day with meals. 60 tablet 1     No current facility-administered medications for this visit.        Total Data Points: 1

## 2021-06-20 NOTE — LETTER
Letter by Nicolás MCCAULEY MD at      Author: Nicolás MCCAULEY MD Service: -- Author Type: --    Filed:  Encounter Date: 9/28/2020 Status: (Other)         September 28, 2020     Patient: Edmundo Fraga   YOB: 1980   Date of Visit: 9/28/2020       To Whom It May Concern:    It is my medical opinion that Edmundo Frgaa may return to work on 9/29/20. Patient admitted to hospital from 9/25/20 to 9/28/20.    If you have any questions or concerns, please don't hesitate to call.    Sincerely,        Electronically signed by Nicolás MCCAULEY MD

## 2021-06-21 NOTE — LETTER
Letter by Kary Joyce BSW at      Author: Kary Joyce BSW Service: -- Author Type: --    Filed:  Encounter Date: 10/19/2020 Status: (Other)       CARE COORDINATION  [address], [address]  [place] [address]    October 19, 2020    Edmundo BROTHERS Philly  [address] [place] [address]  [place] [address]      Dear Edmundo,    I am a clinic care coordinator who works with Sawyer Hansen MD at the [facility]. I wanted to thank you for spending the time to talk with me.  Below is a description of clinic care coordination and how I can further assist you.      The clinic care coordination team is made up of a registered nurse,  and community health worker who understand the health care system. The goal of clinic care coordination is to help you manage your health and improve access to the health care system in the most efficient manner. The team can assist you in meeting your health care goals by providing education, coordinating services, strengthening the communication among your providers and supporting you with any resource needs.    Please feel free to contact the Community Health Worker at [phone] with any questions or concerns. We are focused on providing you with the highest-quality healthcare experience possible and that all starts with you.     Sincerely,     AMANDA Webb, LSW    Enclosed: I have enclosed a copy of the Care Plan. This has helpful information and goals that we have talked about. Please keep this in an easy to access place to use as needed.

## 2021-06-21 NOTE — LETTER
Letter by Sawyer Hansen MD at      Author: Sawyer Hansen MD Service: -- Author Type: --    Filed:  Encounter Date: 12/2/2020 Status: (Other)               [address]  [place] [address]   PHONE [phone]  [phone]     December 2, 2020  Edmundo Fraga  [address] [place] [address]    Dear Melindar:    Below are the results from your recent visit.  Your results are all normal except for a mild elevation in the cholesterol.  It is not high enough that I would recommend medication yet.  Work on diet changes to eat less foods high in cholesterol (eggs, fried foods, processed meats) and more foods high in fiber (fruits, veggies, beans, whole grains, cereals) and we should recheck things here in the clinic in 1 year.         Resulted Orders   HIV Antigen/Antibody Screening Cascade   Result Value Ref Range    HIV Antigen / Antibody Negative Negative    Narrative    Method is Abbott HIV Ag/Ab for the detection of HIV p24 antigen, HIV-1 antibodies and HIV-2 antibodies.   Hepatitis C Antibody (Anti-HCV)   Result Value Ref Range    Hepatitis C Ab Negative Negative   Hepatitis B Surface Antigen (HBsAG)   Result Value Ref Range    Hepatitis B Surface Ag Negative Negative   Glycosylated Hemoglobin A1c   Result Value Ref Range    Hemoglobin A1c 5.2 <=5.6 %      Comment:      Normal <5.7% Prediabete 5.7-6.4% Diabletes 6.5% or higher - adopted from ADA consensus guidelines   LDL Cholesterol, Direct   Result Value Ref Range    Direct  (H) <=129 mg/dl     If you have any questions or concerns, please do not hesitate to call.    Sincerely,  Sawyer Hansen MD  December 2, 2020

## 2021-06-21 NOTE — LETTER
Letter by Kary Joyce BSW at      Author: Kary Joyce BSW Service: -- Author Type: --    Filed:  Encounter Date: 3/15/2021 Status: (Other)       CARE COORDINATION  [facility]  [address]  [place] [address]    March 15, 2021    Edmundo Fraga  [address]  [place] [address]      Dear Edmundo,  Your Care Team congratulates you on your journey to maintain wellness. This document will help guide you on your journey to maintain a healthy lifestyle.  You can use this to help you overcome any barriers you may encounter.  If you should have any questions or concerns, you can contact the members of your Care Team or contact your Primary Care Clinic for assistance.    Health Maintenance  Health Maintenance Reviewed:      My Access Plan  Medical Emergency 911   Primary Clinic Line Sawyer Hansen MD - [phone]   24 Hour Appointment Line [phone] or  [phone]) (toll-free)   24 Hour Nurse Line [phone]   Preferred Urgent Care     Preferred Hospital     Preferred Pharmacy [facility] - [place], [place] - [address] Behavioral Health Crisis Line The National Suicide Prevention Lifeline at 1-715.323.6796 or 911     My Care Team Members  Patient Care Team       Relationship Specialty Notifications Start End    Sawyer Hansen MD PCP - General   7/9/07     Phone: [phone] Fax: [phone]         [address] [place] [address] [address] [place] [address]    Sawyer Hansen MD Assigned PCP   10/19/20     Phone: [phone] Fax: [phone]         [address] [place] [address] [address] [place] [address]              Goals        Patient Stated    ? COMPLETED: Financial Wellbeing (pt-stated)      Goal statement: I would like to have active health insurance and resolve any outstanding account balance within the next 90 days.    Date goal set: 10/19/20  Barriers: Language barrier, lack of health insurance, needs follow up with PCP  Strengths: Appears willing to accept  support, engage with CCC team members  Date to achieve by: 1/19/21  Patient expressed understanding of goal: Yes    Action steps to achieve this goal:  1.  I have received my updated insurance card and have a 0.00 balance with [facility].         ? COMPLETED: Financial Wellbeing (pt-stated)      Goal Statement: I would like assistance resolving my old balance that occurred before my [employer] insurance became effective within the next 60 days.    Date Goal set: 12/15/20  Barriers: Unfamiliar with managing medical bills  Strengths: Engaged and open for help  Date to Achieve By: 2/15/21  Patient expressed understanding of goal: Yes    Personal Plan:  1. My account balance is resolved and I will contact the billing department at [phone] if I receive a bill in the mail that I have questions about.        ? COMPLETED: Medical (pt-stated)      Goal statement: I will schedule and attend a follow up appointment with my PCP within the next 45 days.     Date goal set: 10/19/20  Barriers: Language barrier, lack of health insurance, needs follow up with PCP  Strengths: Appears willing to accept support, engage with CCC team members  Date to achieve by: 12/1/20  Patient expressed understanding of goal: Yes    Personal Plan:  1. I will follow up with my PCP as needed and complete my next annual physical November 2021.             Advance Care Plans/Directives Type:      We notice that you do not have an Advance Directive on file. Upon completion of your Health Care Directive, please bring a copy with you to your next office visit.    It has been your Clinic Care Team's pleasure to work with you on your goals.    Regards,  Your Clinic Care Team

## 2021-06-21 NOTE — LETTER
Letter by Kary Joyce BSW at      Author: Kary Joyce BSW Service: -- Author Type: --    Filed:  Encounter Date: 10/19/2020 Status: (Other)       Care Plan  About Me:    Patient Name:  Edmundo Fraga    YOB: 1980  Age:         40 y.o.   [facility]    [id number] Telephone Information:  Home Phone [phone]   Mobile [phone]       Address:  [address] [place] [address] Email address:  No e-mail address on record      Emergency Contact(s)  Extended Emergency Contact Information      Name: Philly,May  Address:       [address] [place] [address]  Home Phone Number: [phone]  Relation: Spouse          Primary language:  English     needed? [employer]:  [phone] op. 1  Other communication barriers: English as a second language  Preferred Method of Communication:     Current living arrangement: I live in a private home with family  Mobility Status/ Medical Equipment: Independent    Health Maintenance  Health Maintenance Reviewed: Not assessed    My Access Plan  Medical Emergency 911   Primary Clinic Line Sawyer Hansen MD - [phone]   24 Hour Appointment Line [phone] or  [phone]) (toll-free)   24 Hour Nurse Line [phone] (toll-free)   [facility] Care [facility], [phone]   [facility]  [phone]   Preferred Pharmacy [facility] - [address]     Behavioral Health Crisis Line The National Suicide Prevention Lifeline at 1-323.815.4763 or 911             My Care Team Members  Patient Care Team       Relationship Specialty Notifications Start End    Sawyer Hansen MD PCP - General   7/9/07     Phone: [phone] Fax: [phone]         [address] [place] [address]    Sam Galvez MD Assigned PCP   7/28/19     Phone: [phone] Fax: [phone]         [address] [place] [address]    Carrie Mejia Financial Resource Worker Primary Care - [facility]  10/15/20     Phone: [phone] Fax: [phone]        Kary Joyce BSW Lead Care Coordinator Primary Care -  Admissions 10/19/20     Fax: [phone]                 My Care Plans  Self Management and Treatment Plan  Goals and (Comments)  Goals        General    Financial Wellbeing (pt-stated)     Notes - Note edited  10/19/2020  4:10 PM by Kary Joyce BSW    Goal statement: I would like to have active health insurance and resolve any outstanding account balance within the next 90 days.    Date goal set: 10/19/20  Barriers: Language barrier, lack of health insurance  Strengths: Appears willing to accept support, engage with CCC team members  Date to achieve by: 1/19/21  Patient expressed understanding of goal: Yes    Action steps to achieve this goal:  1.  I will answer the phone when FRW contacts me to follow up about the status of my health insurance on 10/23 at 9am.  2.  I will provide all necessary paperwork/documentation to assist in this process.               Advance Care Plans/Directives Type:        My Medical and Care Information  Problem List   Patient Active Problem List   Diagnosis   ? h/o kidney stone   ? Epididymoorchitis   ? E. coli UTI   ? Leukocytosis (leucocytosis)      Current Medications and Allergies:  See printed Medication Report.    Care Coordination Start Date: 10/19/2020   Frequency of Care Coordination:     Form Last Updated: 10/19/2020

## 2021-06-29 NOTE — PROGRESS NOTES
Progress Notes by Kary Joyce BSW at 10/19/2020  1:00 PM     Author: Kary Joyce BSW Service: -- Author Type:     Filed: 10/19/2020  4:18 PM Encounter Date: 10/19/2020 Status: Signed    : Kary Joyce BSW ()       Clinic Care Coordination Contact    Clinic Care Coordination Contact  OUTREACH    Visit Note:    Present for today's phone assessment is pt and [facility] SW.    Edmundo Fraga is a 39yo male who was referred for [facility] assessment due to lack of health insurance. He was recently hospitalized from 9/25/20-9/28/20 and has a large outstanding account balance from this hospitalization.    He is requesting assistance correcting the name on his current insurance card, which is incorrect. He knows to expect a call from [facility] this Friday 10/23 at 9am. He wrote the appointment down today during our call. He will be enrolled to [facility] for [facility] activity and associated goal at this time.    He needs to follow up with his PCP for a 2nd hospital follow up visit once his insurance is active. See PCP note dated 10/13/20 for additional detail.    Plan:  1.) See goal.  2.) Begin scheduled outreach.    Referral Information:  Referral Source: PCP  Primary Diagnosis: Psychosocial    Chief Complaint   Patient presents with   ? Clinic Care Coordination - Initial       Clinic Utilization  Difficulty keeping appointments:: No  Compliance Concerns: No  No-Show Concerns: No  No PCP office visit in Past Year: No     Utilization    Last refreshed: 10/19/2020  1:53 PM: Hospital Admissions 1           Last refreshed: 10/19/2020  1:53 PM: ED Visits 1           Last refreshed: 10/19/2020  1:53 PM: No Show Count (past year) 0              Current as of: 10/19/2020  1:53 PM            Clinical Concerns:  Current Medical Concerns: See Problem List    Current Behavioral Concerns: None    Pain  Pain (GOAL):: No  Health Maintenance Reviewed: Not assessed  Clinical Pathway: None     Medication  Management: Pt reports taking medications as prescribed by PCP.     Functional Status:  Dependent ADLs:: Independent  Dependent IADLs:: Independent  Bed or wheelchair confined:: No  Mobility Status: Independent  Fallen 2 or more times in the past year?: No  Any fall with injury in the past year?: No    Living Situation:  Current living arrangement:: I live in a private home with family  Type of residence:: Private home - stairs    Lifestyle & Psychosocial Needs:  Lifestyle   ? Physical activity     Days per week: 5 days     Minutes per session: 30 min   ? Stress: Only a little     Social Needs   ? Financial resource strain: Somewhat hard   ? Food insecurity     Worry: Never true     Inability: Never true   ? Transportation needs     Medical: No     Non-medical: No     Diet:: Regular  Inadequate nutrition (GOAL):: No  Tube Feeding: No  Inadequate activity/exercise (GOAL):: No  Significant changes in sleep pattern (GOAL): No  Transportation means:: Regular car, Family, Friend     Yazidi or spiritual beliefs that impact treatment:: No  Mental health DX:: No  Mental health management concern (GOAL):: No  Chemical Dependency Status: (N/A)  Chemical Dependency Management: (N/A)  Informal Support system:: Friends, Family, Spouse     Socioeconomic History   ? Marital status:      Spouse name: May Lay   ? Number of children: 4   ? Years of education: Not on file   ? Highest education level: Not on file   Relationships   ? Social connections     Talks on phone: More than three times a week     Gets together: More than three times a week     Attends Evangelical service: More than 4 times per year     Active member of club or organization: No     Attends meetings of clubs or organizations: Never     Relationship status:    ? Intimate partner violence     Fear of current or ex partner: No     Emotionally abused: No     Physically abused: No     Forced sexual activity: No     Tobacco Use   ? Smoking status: Former  Smoker     Quit date: 2012     Years since quittin.9   ? Smokeless tobacco: Never Used   Substance and Sexual Activity   ? Alcohol use: Yes     Alcohol/week: 2.0 standard drinks     Types: 2 Cans of beer per week     Frequency: 2-4 times a month     Drinks per session: 1 or 2     Binge frequency: Never   ? Drug use: No   ? Sexual activity: Yes     Partners: Female     Community Resources: [facility]  Supplies Currently Used at Home: None  Equipment Currently Used at Home: none    Advance Care Plan/Directive  Advanced Care Plans/Directives on file:: No  Advanced Care Plan/Directive Status: Considering Options    Referrals Placed: Financial Services    Goals        Patient Stated    ? Financial Wellbeing (pt-stated)      Goal statement: I would like to have active health insurance and resolve any outstanding account balance within the next 90 days.    Date goal set: 10/19/20  Barriers: Language barrier, lack of health insurance, needs follow up with PCP  Strengths: Appears willing to accept support, engage with CCC team members  Date to achieve by: 21  Patient expressed understanding of goal: Yes    Action steps to achieve this goal:  1.  I will answer the phone when FRW contacts me to follow up about the status of my health insurance on 10/23 at 9am.  2.  I will provide all necessary paperwork/documentation to assist in this process.      ? Medical (pt-stated)      Goal statement: I will schedule and attend a follow up appointment with my PCP within the next 45 days.     Date goal set: 10/19/20  Barriers: Language barrier, lack of health insurance, needs follow up with PCP  Strengths: Appears willing to accept support, engage with CCC team members  Date to achieve by: 20  Patient expressed understanding of goal: Yes    Action steps to achieve this goal:  1.  I will ask CHW for assistance scheduling a 2nd hospital follow up visit with my PCP.  2.  I will attend my appointment with PCP as  scheduled.          Future Appointments              In 4 days Carrie Mejia [facility]

## 2021-12-15 ENCOUNTER — VIRTUAL VISIT (OUTPATIENT)
Dept: FAMILY MEDICINE | Facility: CLINIC | Age: 41
End: 2021-12-15
Payer: COMMERCIAL

## 2021-12-15 DIAGNOSIS — F32.1 CURRENT MODERATE EPISODE OF MAJOR DEPRESSIVE DISORDER, UNSPECIFIED WHETHER RECURRENT (H): Primary | ICD-10-CM

## 2021-12-15 DIAGNOSIS — F41.1 GAD (GENERALIZED ANXIETY DISORDER): ICD-10-CM

## 2021-12-15 PROCEDURE — 99443 PR PHYSICIAN TELEPHONE EVALUATION 21-30 MIN: CPT | Performed by: STUDENT IN AN ORGANIZED HEALTH CARE EDUCATION/TRAINING PROGRAM

## 2021-12-15 ASSESSMENT — ANXIETY QUESTIONNAIRES
6. BECOMING EASILY ANNOYED OR IRRITABLE: MORE THAN HALF THE DAYS
2. NOT BEING ABLE TO STOP OR CONTROL WORRYING: NEARLY EVERY DAY
1. FEELING NERVOUS, ANXIOUS, OR ON EDGE: NEARLY EVERY DAY
5. BEING SO RESTLESS THAT IT IS HARD TO SIT STILL: NOT AT ALL
IF YOU CHECKED OFF ANY PROBLEMS ON THIS QUESTIONNAIRE, HOW DIFFICULT HAVE THESE PROBLEMS MADE IT FOR YOU TO DO YOUR WORK, TAKE CARE OF THINGS AT HOME, OR GET ALONG WITH OTHER PEOPLE: SOMEWHAT DIFFICULT
7. FEELING AFRAID AS IF SOMETHING AWFUL MIGHT HAPPEN: NEARLY EVERY DAY
3. WORRYING TOO MUCH ABOUT DIFFERENT THINGS: NEARLY EVERY DAY
GAD7 TOTAL SCORE: 15

## 2021-12-15 ASSESSMENT — PATIENT HEALTH QUESTIONNAIRE - PHQ9
SUM OF ALL RESPONSES TO PHQ QUESTIONS 1-9: 11
5. POOR APPETITE OR OVEREATING: SEVERAL DAYS

## 2021-12-15 NOTE — PROGRESS NOTES
Edmundo is a 41 year old who is being evaluated via a billable telephone visit.      What phone number would you like to be contacted at?  How would you like to obtain your AVS? Mail a copy     Assessment & Plan     Current moderate episode of major depressive disorder, unspecified whether recurrent (H)  Patient willing to see psychology. Does sound like he has struggled with anxiety and depression in the past. He would also like to try medication and we did discuss possible side effects of sexual dysfunction and as well as nausea/vomiitng. He does feel safe in his home and not concerned he would hurt himself or others. Crisis line provided and he will reach out if having issues. Follow up with me in 4 weeks. Sooner if needed.   - sertraline (ZOLOFT) 50 MG tablet  Dispense: 90 tablet; Refill: 3  - Adult Mental Health Referral    SHADE (generalized anxiety disorder)  - sertraline (ZOLOFT) 50 MG tablet  Dispense: 90 tablet; Refill: 3  - Adult Mental Health Referral         Depression Screening Follow Up    PHQ 12/15/2021   PHQ-9 Total Score 11   Q9: Thoughts of better off dead/self-harm past 2 weeks Several days         Follow Up      Follow Up Actions Taken  Mental Health Referral placed  Follow up with me and crisis line information given    Discussed the following ways the patient can remain in a safe environment:  remove alcohol and remove drugs      Return in about 4 weeks (around 1/12/2022) for Follow up.    Ghanshyam Kim MD  [facility]    Beni Wyatt is a 41 year old who presents for the following health issues   UNABLE TO GO FURTHER- PATIENT TOOK ANOTHER PHONE CALL.  PHQ AND SHADE DONE.   HPI     Abnormal Mood Symptoms  Description: depression   Depression (if yes, do PHQ-9): YES  Anxiety (if yes, do SHADE-7): YES  Accompanying Signs & Symptoms:  Still participating in activities that you used to enjoy: Yes  Fatigue: yes  Irritability: yes  Difficulty concentrating: no  Changes in  appetite: no  Problems with sleep: yes  Heart racing/beating fast: no  Abnormally elevated, expansive, or irritable mood: YES  Persistently increased activity or energy: YES  Thoughts of hurting yourself or others: rarely, no plan, feels safe at home  History:  Recent stress or major life event: YES-  Prior depression or anxiety: thinks yes  Family history of depression or anxiety: unkown  Alcohol/drug use: no  Difficulty sleeping: yes    He has not been seen for symptoms in the past but feels things are worse recently. Wanting to get things figured out now. He is willing to see counseling but has not done this in the past. He is busy working so hard to fit things into his schedule. Does note previous thoughts of hurting himself but no plan and not worried he would do anything. No previous attempts in the past.     PHQ 12/15/2021   PHQ-9 Total Score 11   Q9: Thoughts of better off dead/self-harm past 2 weeks Several days     SHADE-7 SCORE 12/15/2021   Total Score 15         Review of Systems   Constitutional, HEENT, cardiovascular, pulmonary, gi and gu systems are negative, except as otherwise noted.      Objective           Vitals:  No vitals were obtained today due to virtual visit.    Physical Exam   healthy, alert and no distress  Phone visit          242 pm to 3:05  Phone call duration: 23 minutes

## 2021-12-16 ASSESSMENT — ANXIETY QUESTIONNAIRES: GAD7 TOTAL SCORE: 15

## 2022-01-06 ENCOUNTER — IMMUNIZATION (OUTPATIENT)
Dept: NURSING | Facility: CLINIC | Age: 42
End: 2022-01-06
Payer: COMMERCIAL

## 2022-01-06 PROCEDURE — 91306 COVID-19,PF,MODERNA (18+ YRS BOOSTER .25ML): CPT

## 2022-01-06 PROCEDURE — 0064A COVID-19,PF,MODERNA (18+ YRS BOOSTER .25ML): CPT

## 2022-11-25 ENCOUNTER — OFFICE VISIT (OUTPATIENT)
Dept: FAMILY MEDICINE | Facility: CLINIC | Age: 42
End: 2022-11-25
Payer: COMMERCIAL

## 2022-11-25 VITALS
HEART RATE: 81 BPM | DIASTOLIC BLOOD PRESSURE: 71 MMHG | BODY MASS INDEX: 22.6 KG/M2 | RESPIRATION RATE: 18 BRPM | WEIGHT: 150 LBS | OXYGEN SATURATION: 98 % | SYSTOLIC BLOOD PRESSURE: 108 MMHG | TEMPERATURE: 99.6 F

## 2022-11-25 DIAGNOSIS — R05.1 ACUTE COUGH: Primary | ICD-10-CM

## 2022-11-25 PROCEDURE — 99213 OFFICE O/P EST LOW 20 MIN: CPT

## 2022-11-25 RX ORDER — BENZONATATE 200 MG/1
200 CAPSULE ORAL 3 TIMES DAILY PRN
Qty: 21 CAPSULE | Refills: 1 | Status: SHIPPED | OUTPATIENT
Start: 2022-11-25 | End: 2023-07-17

## 2022-11-26 NOTE — PATIENT INSTRUCTIONS
Take Tessalon as prescribed.  Get plenty of rest and drink fluids.  Can use Tylenol and/or ibuprofen as needed for pain and fever.  Maximum dose of Tylenol is 4000mg in a 24 hour period of time.  No more than 8 extra strength Tylenol. Take ibuprofen with food to avoid stomach upset.   Space these out every 6-8 hours.  After the first dose of one you can take the other an hour later.

## 2022-11-26 NOTE — PROGRESS NOTES
ASSESSMENT:  (R05.1) Acute cough  (primary encounter diagnosis)  Plan: benzonatate (TESSALON) 200 MG capsule    PLAN:  Informed the patient to take Tessalon as prescribed, get plenty of rest, drink fluids and use Tylenol and/or ibuprofen as needed for pain and fever.  We discussed the maximum dose of Tylenol is 4000 mg in 24-hour period of time which is no more than 8 extra strength Tylenol.  Informed her to take ibuprofen with food to avoid upset stomach.  We discussed the frequency being every 6-8 hours and then after the first dose of 1 he can take another an hour later.  Informed him to return to clinic with any new or worsening symptoms.  Patient acknowledged his understanding of the above plan.    PEGGY Wood CNP      SUBJECTIVE:  Edmundo Fraga is a 42 year old male who presents to the clinic today with a chief complaint of cough, nasal and fever/chills  for 4 day(s).  His cough is described as productive yellow.  The patient's symptoms are moderate to severe and not changing over the course of time.  The patient's symptoms are exacerbated by cold air and lying down  Patient has been using Mucinex, DayQuil, NyQuil and Tyelnol  to improve symptoms.    Patient did not do an at home COVID test.     ROS  Review of systems negative except as stated above.    OBJECTIVE:  /71 (BP Location: Right arm, Patient Position: Sitting, Cuff Size: Adult Large)   Pulse 81   Temp 99.6  F (37.6  C) (Oral)   Resp 18   Wt 68 kg (150 lb)   SpO2 98%   BMI 22.60 kg/m    GENERAL APPEARANCE: healthy, alert and no distress  EYES: EOMI,  PERRL, conjunctiva clear  HENT: ear canals and TM's normal.  Nose and mouth without ulcers, erythema or lesions  NECK: supple, nontender, no lymphadenopathy  RESP: lungs clear to auscultation - no rales, rhonchi or wheezes  CV: regular rates and rhythm, normal S1 S2, no murmur noted  SKIN: no suspicious lesions or rashes

## 2022-11-30 ENCOUNTER — IMMUNIZATION (OUTPATIENT)
Dept: FAMILY MEDICINE | Facility: CLINIC | Age: 42
End: 2022-11-30
Payer: COMMERCIAL

## 2022-11-30 PROCEDURE — G0008 ADMIN INFLUENZA VIRUS VAC: HCPCS

## 2022-11-30 PROCEDURE — 91313 COVID-19 VACCINE BIVALENT BOOSTER 18+ (MODERNA): CPT

## 2022-11-30 PROCEDURE — 0134A COVID-19 VACCINE BIVALENT BOOSTER 18+ (MODERNA): CPT

## 2022-11-30 PROCEDURE — 90682 RIV4 VACC RECOMBINANT DNA IM: CPT

## 2023-05-16 ENCOUNTER — OFFICE VISIT (OUTPATIENT)
Dept: FAMILY MEDICINE | Facility: CLINIC | Age: 43
End: 2023-05-16
Payer: COMMERCIAL

## 2023-05-16 VITALS
BODY MASS INDEX: 23.19 KG/M2 | RESPIRATION RATE: 18 BRPM | HEIGHT: 68 IN | DIASTOLIC BLOOD PRESSURE: 74 MMHG | WEIGHT: 153 LBS | OXYGEN SATURATION: 96 % | HEART RATE: 73 BPM | SYSTOLIC BLOOD PRESSURE: 110 MMHG | TEMPERATURE: 98.7 F

## 2023-05-16 DIAGNOSIS — F33.1 MODERATE EPISODE OF RECURRENT MAJOR DEPRESSIVE DISORDER (H): ICD-10-CM

## 2023-05-16 DIAGNOSIS — Z71.84 TRAVEL ADVICE ENCOUNTER: Primary | ICD-10-CM

## 2023-05-16 PROBLEM — F33.9 MAJOR DEPRESSION, RECURRENT (H): Status: ACTIVE | Noted: 2023-05-16

## 2023-05-16 PROCEDURE — 90471 IMMUNIZATION ADMIN: CPT | Performed by: FAMILY MEDICINE

## 2023-05-16 PROCEDURE — 99214 OFFICE O/P EST MOD 30 MIN: CPT | Mod: 25 | Performed by: FAMILY MEDICINE

## 2023-05-16 PROCEDURE — 96127 BRIEF EMOTIONAL/BEHAV ASSMT: CPT | Performed by: FAMILY MEDICINE

## 2023-05-16 PROCEDURE — 90691 TYPHOID VACCINE IM: CPT | Performed by: FAMILY MEDICINE

## 2023-05-16 RX ORDER — BUPROPION HYDROCHLORIDE 150 MG/1
150 TABLET ORAL EVERY MORNING
Qty: 30 TABLET | Refills: 1 | Status: SHIPPED | OUTPATIENT
Start: 2023-05-16 | End: 2023-07-17

## 2023-05-16 ASSESSMENT — PATIENT HEALTH QUESTIONNAIRE - PHQ9
10. IF YOU CHECKED OFF ANY PROBLEMS, HOW DIFFICULT HAVE THESE PROBLEMS MADE IT FOR YOU TO DO YOUR WORK, TAKE CARE OF THINGS AT HOME, OR GET ALONG WITH OTHER PEOPLE: SOMEWHAT DIFFICULT
SUM OF ALL RESPONSES TO PHQ QUESTIONS 1-9: 20
SUM OF ALL RESPONSES TO PHQ QUESTIONS 1-9: 20

## 2023-05-16 NOTE — PROGRESS NOTES
Assessment & Plan     Travel advice encounter  Handout from CDC guidelines for [place] given chemoprophylaxis not advised patient will use a mosquito repellent received typhoid vaccination tetanus up-to-date.  Proper sanitation guidelines given to patient.    Moderate episode of recurrent major depressive disorder (H)    Discussed details in depth no suicidal ideation.  States that previous doses of an SSRI did not help.  States that he is fatigued and does not have energy to do things.  Does not want a medication that would make him feel tired.  - buPROPion (WELLBUTRIN XL) 150 MG 24 hr tablet; Take 1 tablet (150 mg) by mouth every morning             Depression Screening Follow Up        5/16/2023     3:40 PM   PHQ   PHQ-9 Total Score 20   Q9: Thoughts of better off dead/self-harm past 2 weeks More than half the days   F/U: Thoughts of suicide or self-harm No   F/U: Safety concerns Yes         5/16/2023     3:40 PM   Last PHQ-9   1.  Little interest or pleasure in doing things 2   2.  Feeling down, depressed, or hopeless 3   3.  Trouble falling or staying asleep, or sleeping too much 3   4.  Feeling tired or having little energy 1   5.  Poor appetite or overeating 2   6.  Feeling bad about yourself 2   7.  Trouble concentrating 2   8.  Moving slowly or restless 3   Q9: Thoughts of better off dead/self-harm past 2 weeks 2   PHQ-9 Total Score 20   In the past two weeks have you had thoughts of suicide or self harm? No   Do you have concerns about your personal safety or the safety of others? Yes               Follow Up      Follow Up Actions Taken  Patient to follow up with PCP.  Clinic staff to schedule appointment if able.    Discussed the following ways the patient can remain in a safe environment:        Sam Galvez MD  [facility] ESMER Wyatt is a 42 year old, presenting for the following health issues:  Travel consult and Depression  42-year-old male here for travel visit to  "[place] he is also he is suffering from depression he reports that he has tried depression medication in 2020 when he found the medication to be too strong made him sleepy and he could not take the medication he says he feels tired and does not have much interest in events he does not have any thoughts of self-harm.  He has no access to firearms.  He states he is felt depressed for most of his adult life.  States his energy level is low but he is looking forward to going to [facility].  Denies anxiety says sometimes he has trouble sleeping.      5/16/2023     3:48 PM   Additional Questions   Roomed by Barb BROTHERS   Accompanied by son Famous     History of Present Illness       Reason for visit:  Vaccination    He eats 2-3 servings of fruits and vegetables daily.He consumes 1 sweetened beverage(s) daily.He exercises with enough effort to increase his heart rate 9 or less minutes per day.  He exercises with enough effort to increase his heart rate 3 or less days per week.   He is taking medications regularly.    Today's PHQ-9         PHQ-9 Total Score: 20    PHQ-9 Q9 Thoughts of better off dead/self-harm past 2 weeks :   More than half the days  Thoughts of suicide or self harm: (P) No  Self-harm Plan:     Self-harm Action:       Safety concerns for self or others: (P) Yes    How difficult have these problems made it for you to do your work, take care of things at home, or get along with other people: Somewhat difficult               Review of Systems   Constitutional, HEENT, cardiovascular, pulmonary, gi and gu systems are negative, except as otherwise noted.      Objective      /74   Pulse 73   Temp 98.7  F (37.1  C) (Oral)   Resp 18   Ht 1.72 m (5' 7.72\")   Wt 69.4 kg (153 lb)   SpO2 96%   BMI 23.46 kg/m      Physical Exam   GENERAL: healthy, alert and no distress  EYES: Eyes grossly normal to inspection, PERRL and conjunctivae and sclerae normal  HENT: ear canals and TM's normal, nose and mouth without " ulcers or lesions  NECK: no adenopathy, no asymmetry, masses, or scars and thyroid normal to palpation  RESP: lungs clear to auscultation - no rales, rhonchi or wheezes  CV: regular rate and rhythm, normal S1 S2, no S3 or S4, no murmur, click or rub, no peripheral edema and peripheral pulses strong  ABDOMEN: soft, nontender, no hepatosplenomegaly, no masses and bowel sounds normal  MS: no gross musculoskeletal defects noted, no edema  SKIN: no suspicious lesions or rashes  NEURO: Normal strength and tone, mentation intact and speech normal  PSYCH: mentation appears normal, affect normal/bright

## 2023-07-17 ENCOUNTER — OFFICE VISIT (OUTPATIENT)
Dept: FAMILY MEDICINE | Facility: CLINIC | Age: 43
End: 2023-07-17
Payer: COMMERCIAL

## 2023-07-17 VITALS
SYSTOLIC BLOOD PRESSURE: 121 MMHG | RESPIRATION RATE: 16 BRPM | WEIGHT: 152.75 LBS | DIASTOLIC BLOOD PRESSURE: 77 MMHG | TEMPERATURE: 98 F | HEIGHT: 68 IN | BODY MASS INDEX: 23.15 KG/M2 | OXYGEN SATURATION: 98 % | HEART RATE: 88 BPM

## 2023-07-17 DIAGNOSIS — F33.0 MILD EPISODE OF RECURRENT MAJOR DEPRESSIVE DISORDER (H): Primary | ICD-10-CM

## 2023-07-17 DIAGNOSIS — Z13.220 SCREENING CHOLESTEROL LEVEL: ICD-10-CM

## 2023-07-17 DIAGNOSIS — Z13.1 SCREENING FOR DIABETES MELLITUS: ICD-10-CM

## 2023-07-17 DIAGNOSIS — L50.9 HIVES: ICD-10-CM

## 2023-07-17 LAB
ALBUMIN SERPL BCG-MCNC: 4.8 G/DL (ref 3.5–5.2)
ALP SERPL-CCNC: 87 U/L (ref 40–129)
ALT SERPL W P-5'-P-CCNC: 55 U/L (ref 0–70)
ANION GAP SERPL CALCULATED.3IONS-SCNC: 13 MMOL/L (ref 7–15)
AST SERPL W P-5'-P-CCNC: 25 U/L (ref 0–45)
BILIRUB SERPL-MCNC: 0.8 MG/DL
BUN SERPL-MCNC: 12.5 MG/DL (ref 6–20)
CALCIUM SERPL-MCNC: 9.2 MG/DL (ref 8.6–10)
CHLORIDE SERPL-SCNC: 103 MMOL/L (ref 98–107)
CHOLEST SERPL-MCNC: 253 MG/DL
CREAT SERPL-MCNC: 1.17 MG/DL (ref 0.67–1.17)
DEPRECATED HCO3 PLAS-SCNC: 24 MMOL/L (ref 22–29)
GFR SERPL CREATININE-BSD FRML MDRD: 80 ML/MIN/1.73M2
GLUCOSE SERPL-MCNC: 106 MG/DL (ref 70–99)
HBA1C MFR BLD: 5.3 % (ref 0–5.6)
HDLC SERPL-MCNC: 43 MG/DL
LDLC SERPL CALC-MCNC: 158 MG/DL
NONHDLC SERPL-MCNC: 210 MG/DL
POTASSIUM SERPL-SCNC: 3.8 MMOL/L (ref 3.4–5.3)
PROT SERPL-MCNC: 7.9 G/DL (ref 6.4–8.3)
SODIUM SERPL-SCNC: 140 MMOL/L (ref 136–145)
TRIGL SERPL-MCNC: 261 MG/DL

## 2023-07-17 PROCEDURE — 83036 HEMOGLOBIN GLYCOSYLATED A1C: CPT | Performed by: FAMILY MEDICINE

## 2023-07-17 PROCEDURE — 80061 LIPID PANEL: CPT | Performed by: FAMILY MEDICINE

## 2023-07-17 PROCEDURE — 99214 OFFICE O/P EST MOD 30 MIN: CPT | Performed by: FAMILY MEDICINE

## 2023-07-17 PROCEDURE — 80053 COMPREHEN METABOLIC PANEL: CPT | Performed by: FAMILY MEDICINE

## 2023-07-17 PROCEDURE — 36415 COLL VENOUS BLD VENIPUNCTURE: CPT | Performed by: FAMILY MEDICINE

## 2023-07-17 RX ORDER — CETIRIZINE HYDROCHLORIDE 10 MG/1
10 TABLET ORAL DAILY
Qty: 90 TABLET | Refills: 3 | Status: SHIPPED | OUTPATIENT
Start: 2023-07-17 | End: 2024-09-19

## 2023-07-17 ASSESSMENT — ANXIETY QUESTIONNAIRES
5. BEING SO RESTLESS THAT IT IS HARD TO SIT STILL: NOT AT ALL
6. BECOMING EASILY ANNOYED OR IRRITABLE: NEARLY EVERY DAY
2. NOT BEING ABLE TO STOP OR CONTROL WORRYING: MORE THAN HALF THE DAYS
4. TROUBLE RELAXING: MORE THAN HALF THE DAYS
7. FEELING AFRAID AS IF SOMETHING AWFUL MIGHT HAPPEN: MORE THAN HALF THE DAYS
1. FEELING NERVOUS, ANXIOUS, OR ON EDGE: MORE THAN HALF THE DAYS
GAD7 TOTAL SCORE: 12
GAD7 TOTAL SCORE: 12
3. WORRYING TOO MUCH ABOUT DIFFERENT THINGS: SEVERAL DAYS
IF YOU CHECKED OFF ANY PROBLEMS ON THIS QUESTIONNAIRE, HOW DIFFICULT HAVE THESE PROBLEMS MADE IT FOR YOU TO DO YOUR WORK, TAKE CARE OF THINGS AT HOME, OR GET ALONG WITH OTHER PEOPLE: SOMEWHAT DIFFICULT

## 2023-07-17 ASSESSMENT — PATIENT HEALTH QUESTIONNAIRE - PHQ9
SUM OF ALL RESPONSES TO PHQ QUESTIONS 1-9: 11
SUM OF ALL RESPONSES TO PHQ QUESTIONS 1-9: 11
10. IF YOU CHECKED OFF ANY PROBLEMS, HOW DIFFICULT HAVE THESE PROBLEMS MADE IT FOR YOU TO DO YOUR WORK, TAKE CARE OF THINGS AT HOME, OR GET ALONG WITH OTHER PEOPLE: SOMEWHAT DIFFICULT

## 2023-07-17 NOTE — PROGRESS NOTES
ASSESMENT AND PLAN:  Diagnoses and all orders for this visit:  Mild episode of recurrent major depressive disorder (H)  Counseling done with the patient with the help of a professional .  Patient has self discontinued the most recent medication which was bupropion, prior to that he did not tolerate SSRI.  We talked about trying another type of medication but the patient feels like his depression is improving and he is now gained a good hold of what he needs to do in his daily life to keep it managed without medication, he is not interested in restarting another medication at this time.  Counseling done to add to his regiment a daily exercise routine, increased time in nature, and will follow closely and he will let me know if he is having worsening of symptoms at which time we could consider venlafaxine.  Counseling done with the patient with the help of a professional .  Hives  No clear etiology based on his history.  We will start cetirizine as prescribed below and if he does not get resolution we will have him come back in to consider further testing or referral to an allergist.  -     cetirizine (ZYRTEC) 10 MG tablet; Take 1 tablet (10 mg) by mouth daily  -     Comprehensive metabolic panel (BMP + Alb, Alk Phos, ALT, AST, Total. Bili, TP); Future  Screening cholesterol level  -     Lipid panel reflex to direct LDL Non-fasting; Future  Screening for diabetes mellitus  -     Hemoglobin A1c; Future      Reviewed the risks and benefits of the treatment plan with the patient and/or caregiver and we discussed indications for routine and emergent follow-up.        SUBJECTIVE: 42-year-old male in for follow-up.  He had been started by my partner on bupropion for depression.  He only took it for about a week and then stopped taking it because it was making him feel too tired.  Since that time, he has started a Bible study and feels like he is overall been taking better care of himself and has  "noticed some good improvement in his depression without medication.  This makes him think that he would like to continue to try to manage his depression without medication.    His other concern is that over the last couple of months he has been getting a raised itchy red rash that comes and goes.  It is happened on several occasions over this past couple of months and he cannot think of any specific triggers.  He cannot think of any new foods or new topical skin products or new medications or supplements or anything that started around the time of his first experience with this rash.    No past medical history on file.  Patient Active Problem List   Diagnosis     h/o kidney stone     Epididymoorchitis     E. coli UTI     Leukocytosis (leucocytosis)     Major depression, recurrent (H)     Current Outpatient Medications   Medication Sig Dispense Refill     cetirizine (ZYRTEC) 10 MG tablet Take 1 tablet (10 mg) by mouth daily 90 tablet 3     History   Smoking Status     Some Days     Types: Cigarettes     Last attempt to quit: 11/23/2012   Smokeless Tobacco     Never       OBJECTICE: /77   Pulse 88   Temp 98  F (36.7  C) (Oral)   Resp 16   Ht 1.72 m (5' 7.72\")   Wt 69.3 kg (152 lb 12 oz)   SpO2 98%   BMI 23.42 kg/m       Recent Results (from the past 24 hour(s))   Hemoglobin A1c    Collection Time: 07/17/23 12:44 PM   Result Value Ref Range    Hemoglobin A1C 5.3 0.0 - 5.6 %        Psychiatric-appearance is well-groomed, speech of normal fluency and rate, mood mildly depressed, affect normal, thought content negative for suicidal or homicidal ideation, thought processing negative for paranoid or delusional thinking.   CV-regular rate and rhythm   RESP-lungs clear   SKIN-currently no rash but he shows me cell phone pictures of his skin from last week that appeared to be hives.    PATIENT HEALTH QUESTIONNAIRE-9 (PHQ - 9)    Over the last 2 weeks, how often have you been bothered by any of the following " problems?    1. Little interest or pleasure in doing things -  Not at all   2. Feeling down, depressed, or hopeless -  More than half the days   3. Trouble falling or staying asleep, or sleeping too much - Nearly every day   4. Feeling tired or having little energy -  Several days   5. Poor appetite or overeating -  Not at all   6. Feeling bad about yourself - or that you are a failure or have let yourself or your family down -  Not at all   7. Trouble concentrating on things, such as reading the newspaper or watching television - Not at all   8. Moving or speaking so slowly that other people could have noticed? Or the opposite - being so fidgety or restless that you have been moving around a lot more than usual Nearly every day   9. Thoughts that you would be better off dead or of hurting  yourself in some way More than half the days   Total Score: 11     If you checked off any problems, how difficult have these problems made it for you to do your work, take care of things at home, or get along with other people?      Developed by Yojana Carrasco, Anat Daniels, Delvin Bowers and colleagues, with an educational leela from [employer] No permission required to reproduce, translate, display or distribute. permission required to reproduce, translate, display or distribute.      Sawyer Hansen MD

## 2023-07-19 ENCOUNTER — TELEPHONE (OUTPATIENT)
Dept: FAMILY MEDICINE | Facility: CLINIC | Age: 43
End: 2023-07-19
Payer: COMMERCIAL

## 2023-07-19 NOTE — TELEPHONE ENCOUNTER
"Writer called patient with the help of a \"Chaparrita\"  regarding provider's message below. Provider message relayed to patient.    Patient verbalizes understanding, agrees with plan and has no further questions.    Closing encounter.    CELIA ContiN, RN   [facility]    "

## 2023-07-19 NOTE — TELEPHONE ENCOUNTER
----- Message from Sawyer Hansen MD sent at 7/18/2023  6:36 PM CDT -----  Team - please call patient with results.  No diabetes.  Normal kidney and liver tests.  Mild elevation in cholesterol.  Increase vegetables and fruits and whole grains in the diet, decreased eggs and meats and we should recheck this test again in 1 to 2 years.

## 2024-09-19 ENCOUNTER — VIRTUAL VISIT (OUTPATIENT)
Dept: INTERPRETER SERVICES | Facility: CLINIC | Age: 44
End: 2024-09-19

## 2024-09-19 ENCOUNTER — OFFICE VISIT (OUTPATIENT)
Dept: FAMILY MEDICINE | Facility: CLINIC | Age: 44
End: 2024-09-19
Payer: COMMERCIAL

## 2024-09-19 VITALS
WEIGHT: 153.2 LBS | TEMPERATURE: 98 F | BODY MASS INDEX: 23.22 KG/M2 | HEART RATE: 66 BPM | HEIGHT: 68 IN | OXYGEN SATURATION: 98 % | DIASTOLIC BLOOD PRESSURE: 88 MMHG | RESPIRATION RATE: 20 BRPM | SYSTOLIC BLOOD PRESSURE: 122 MMHG

## 2024-09-19 DIAGNOSIS — R14.3 FLATULENCE, ERUCTATION AND GAS PAIN: ICD-10-CM

## 2024-09-19 DIAGNOSIS — R42 LIGHTHEADEDNESS: ICD-10-CM

## 2024-09-19 DIAGNOSIS — Z00.00 ANNUAL PHYSICAL EXAM: Primary | ICD-10-CM

## 2024-09-19 DIAGNOSIS — R14.1 FLATULENCE, ERUCTATION AND GAS PAIN: ICD-10-CM

## 2024-09-19 DIAGNOSIS — L50.9 HIVES: ICD-10-CM

## 2024-09-19 DIAGNOSIS — R14.2 FLATULENCE, ERUCTATION AND GAS PAIN: ICD-10-CM

## 2024-09-19 DIAGNOSIS — H53.9 VISION CHANGES: ICD-10-CM

## 2024-09-19 LAB
ALBUMIN SERPL BCG-MCNC: 4.5 G/DL (ref 3.5–5.2)
ALP SERPL-CCNC: 95 U/L (ref 40–150)
ALT SERPL W P-5'-P-CCNC: 66 U/L (ref 0–70)
ANION GAP SERPL CALCULATED.3IONS-SCNC: 10 MMOL/L (ref 7–15)
AST SERPL W P-5'-P-CCNC: 26 U/L (ref 0–45)
BASOPHILS # BLD AUTO: 0 10E3/UL (ref 0–0.2)
BASOPHILS NFR BLD AUTO: 0 %
BILIRUB SERPL-MCNC: 1 MG/DL
BUN SERPL-MCNC: 17 MG/DL (ref 6–20)
CALCIUM SERPL-MCNC: 9.2 MG/DL (ref 8.8–10.4)
CHLORIDE SERPL-SCNC: 99 MMOL/L (ref 98–107)
CHOLEST SERPL-MCNC: 231 MG/DL
CREAT SERPL-MCNC: 1.07 MG/DL (ref 0.67–1.17)
CRP SERPL-MCNC: <3 MG/L
EGFRCR SERPLBLD CKD-EPI 2021: 88 ML/MIN/1.73M2
EOSINOPHIL # BLD AUTO: 0.2 10E3/UL (ref 0–0.7)
EOSINOPHIL NFR BLD AUTO: 3 %
ERYTHROCYTE [DISTWIDTH] IN BLOOD BY AUTOMATED COUNT: 11.9 % (ref 10–15)
ERYTHROCYTE [SEDIMENTATION RATE] IN BLOOD BY WESTERGREN METHOD: 6 MM/HR (ref 0–15)
EST. AVERAGE GLUCOSE BLD GHB EST-MCNC: 103 MG/DL
FASTING STATUS PATIENT QL REPORTED: YES
FASTING STATUS PATIENT QL REPORTED: YES
GLUCOSE SERPL-MCNC: 94 MG/DL (ref 70–99)
HBA1C MFR BLD: 5.2 % (ref 0–5.6)
HCO3 SERPL-SCNC: 29 MMOL/L (ref 22–29)
HCT VFR BLD AUTO: 47.9 % (ref 40–53)
HDLC SERPL-MCNC: 42 MG/DL
HGB BLD-MCNC: 16.7 G/DL (ref 13.3–17.7)
IMM GRANULOCYTES # BLD: 0 10E3/UL
IMM GRANULOCYTES NFR BLD: 0 %
LDLC SERPL CALC-MCNC: 127 MG/DL
LYMPHOCYTES # BLD AUTO: 2.2 10E3/UL (ref 0.8–5.3)
LYMPHOCYTES NFR BLD AUTO: 35 %
MCH RBC QN AUTO: 29.6 PG (ref 26.5–33)
MCHC RBC AUTO-ENTMCNC: 34.9 G/DL (ref 31.5–36.5)
MCV RBC AUTO: 85 FL (ref 78–100)
MONOCYTES # BLD AUTO: 0.5 10E3/UL (ref 0–1.3)
MONOCYTES NFR BLD AUTO: 9 %
NEUTROPHILS # BLD AUTO: 3.2 10E3/UL (ref 1.6–8.3)
NEUTROPHILS NFR BLD AUTO: 52 %
NONHDLC SERPL-MCNC: 189 MG/DL
PLATELET # BLD AUTO: 193 10E3/UL (ref 150–450)
POTASSIUM SERPL-SCNC: 4.2 MMOL/L (ref 3.4–5.3)
PROT SERPL-MCNC: 7.7 G/DL (ref 6.4–8.3)
RBC # BLD AUTO: 5.64 10E6/UL (ref 4.4–5.9)
SODIUM SERPL-SCNC: 138 MMOL/L (ref 135–145)
T4 FREE SERPL-MCNC: 1.37 NG/DL (ref 0.9–1.7)
TRIGL SERPL-MCNC: 311 MG/DL
TSH SERPL DL<=0.005 MIU/L-ACNC: 5.24 UIU/ML (ref 0.3–4.2)
WBC # BLD AUTO: 6.2 10E3/UL (ref 4–11)

## 2024-09-19 PROCEDURE — 90656 IIV3 VACC NO PRSV 0.5 ML IM: CPT | Performed by: FAMILY MEDICINE

## 2024-09-19 PROCEDURE — T1013 SIGN LANG/ORAL INTERPRETER: HCPCS | Mod: U4

## 2024-09-19 PROCEDURE — 93005 ELECTROCARDIOGRAM TRACING: CPT | Performed by: FAMILY MEDICINE

## 2024-09-19 PROCEDURE — 85652 RBC SED RATE AUTOMATED: CPT | Performed by: FAMILY MEDICINE

## 2024-09-19 PROCEDURE — 90480 ADMN SARSCOV2 VAC 1/ONLY CMP: CPT | Performed by: FAMILY MEDICINE

## 2024-09-19 PROCEDURE — 86140 C-REACTIVE PROTEIN: CPT | Performed by: FAMILY MEDICINE

## 2024-09-19 PROCEDURE — 91320 SARSCV2 VAC 30MCG TRS-SUC IM: CPT | Performed by: FAMILY MEDICINE

## 2024-09-19 PROCEDURE — 84439 ASSAY OF FREE THYROXINE: CPT | Performed by: FAMILY MEDICINE

## 2024-09-19 PROCEDURE — 99396 PREV VISIT EST AGE 40-64: CPT | Mod: 25 | Performed by: FAMILY MEDICINE

## 2024-09-19 PROCEDURE — 80053 COMPREHEN METABOLIC PANEL: CPT | Performed by: FAMILY MEDICINE

## 2024-09-19 PROCEDURE — 99214 OFFICE O/P EST MOD 30 MIN: CPT | Mod: 25 | Performed by: FAMILY MEDICINE

## 2024-09-19 PROCEDURE — 84443 ASSAY THYROID STIM HORMONE: CPT | Performed by: FAMILY MEDICINE

## 2024-09-19 PROCEDURE — 36415 COLL VENOUS BLD VENIPUNCTURE: CPT | Performed by: FAMILY MEDICINE

## 2024-09-19 PROCEDURE — 85025 COMPLETE CBC W/AUTO DIFF WBC: CPT | Performed by: FAMILY MEDICINE

## 2024-09-19 PROCEDURE — 90677 PCV20 VACCINE IM: CPT | Performed by: FAMILY MEDICINE

## 2024-09-19 PROCEDURE — 83036 HEMOGLOBIN GLYCOSYLATED A1C: CPT | Performed by: FAMILY MEDICINE

## 2024-09-19 PROCEDURE — 90471 IMMUNIZATION ADMIN: CPT | Performed by: FAMILY MEDICINE

## 2024-09-19 PROCEDURE — 90472 IMMUNIZATION ADMIN EACH ADD: CPT | Performed by: FAMILY MEDICINE

## 2024-09-19 PROCEDURE — 80061 LIPID PANEL: CPT | Performed by: FAMILY MEDICINE

## 2024-09-19 RX ORDER — CETIRIZINE HYDROCHLORIDE 10 MG/1
10 TABLET ORAL DAILY
Qty: 90 TABLET | Refills: 3 | Status: SHIPPED | OUTPATIENT
Start: 2024-09-19

## 2024-09-19 RX ORDER — SIMETHICONE 125 MG
125 TABLET,CHEWABLE ORAL 4 TIMES DAILY PRN
Qty: 120 TABLET | Refills: 5 | Status: SHIPPED | OUTPATIENT
Start: 2024-09-19

## 2024-09-19 ASSESSMENT — PATIENT HEALTH QUESTIONNAIRE - PHQ9
SUM OF ALL RESPONSES TO PHQ QUESTIONS 1-9: 3
10. IF YOU CHECKED OFF ANY PROBLEMS, HOW DIFFICULT HAVE THESE PROBLEMS MADE IT FOR YOU TO DO YOUR WORK, TAKE CARE OF THINGS AT HOME, OR GET ALONG WITH OTHER PEOPLE: NOT DIFFICULT AT ALL
SUM OF ALL RESPONSES TO PHQ QUESTIONS 1-9: 3

## 2024-09-19 NOTE — PROGRESS NOTES
Preventive Care Visit  [facility]  Susan Thomas MD, Family Medicine  Sep 19, 2024      Assessment & Plan     Hives  Recurrent for over a year. Unclear if any trigger. Continue zyrtec nightly. Labs as below, and referral to Allergy.   - cetirizine (ZYRTEC) 10 MG tablet  Dispense: 90 tablet; Refill: 3  - CBC with platelets and differential  - TSH with free T4 reflex  - Adult Allergy/Asthma  Referral  - ESR: Erythrocyte sedimentation rate  - CRP, inflammation    Lightheadedness  Episodes 2-3 times/week for 2 years. No syncopal episodes or LOC. Associated vision symptoms. 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   - CBC with platelets and differential  - TSH with free T4 reflex  - Comprehensive metabolic panel (BMP + Alb, Alk Phos, ALT, AST, Total. Bili, TP)  - EKG 12-lead, tracing only  - MR Brain w/o & w Contrast  - Echocardiogram Complete  - Adult Holter Monitor 48 hour  - US Carotid Bilateral  - ophthalmology referral     Annual physical exam  Willing to do covid, PCV, flu vaccines today  - Lipid panel  - Hemoglobin A1c    Flatulence, eructation and gas pain  Not new, ongoing for years. Patient interested in trying simethicone for his gas and bloating  - simethicone (MYLICON) 125 MG chewable tablet  Dispense: 120 tablet; Refill: 5    History of depression  Denies symptoms currently, denies SI (not sure why answered yes on rooming, it seems patient did not understand question).               Nicotine/Tobacco Cessation  He reports that he has been smoking cigarettes. He has never been exposed to tobacco smoke. He has never used smokeless tobacco.  Nicotine/Tobacco Cessation Plan  Information offered: Patient not interested at this time      Depression Screening Follow Up        9/19/2024    11:27 AM   PHQ   PHQ-9 Total Score 1   Q9: Thoughts of better off dead/self-harm past 2 weeks Several days     Patient denies SI                    Follow Up  "Actions Taken  DENIES SI    Discussed the following ways the patient can remain in a safe environment:    DENIES SI    Counseling  Appropriate preventive services were addressed with this patient via screening, questionnaire, or discussion as appropriate for fall prevention, nutrition, physical activity, Tobacco-use cessation, social engagement, weight loss and cognition.  Checklist reviewing preventive services available has been given to the patient.  Reviewed patient's diet, addressing concerns and/or questions.   The patient was instructed to see the dentist every 6 months.           Beni Wyatt is a 44 year old, presenting for the following:  Physical        9/19/2024    11:24 AM   Additional Questions   Roomed by MIGUEL INFANTE CMA   Accompanied by NONE        Health Care Directive  Patient does not have a Health Care Directive or Living Will: Discussed advance care planning with patient; however, patient declined at this time.    HPI  Annual exam with concerns as below:    Hives-  Itchy skin rash - on/off, previously seen for this - zyrtec helps resolve it but does not \"cure\" it - it comes back. Shows a photo on his phone - consistent w urticaria. Wants to know if there is an allergy or reason. Happens on lips sometimes - they feel itchy, look a bit red and swollen - if he scratches, lips swell. Can happen daily or 2-3 times/week. Symptoms can be mild or sometimes more severe. Has not identified any specific triggers. Ran out of zyrtec 2 months ago. Zyrtec makes him tired, so tried to take only in evening. Some days did not take if no rash.     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 Pulls over when symptoms start. Lasts 5-10 minutes, resolves on its own. In past, happened 2-3x/week, last week only once. Going on for 2 years, but has never mentioned it to medical provider. Has not had LOC or syncopal " episode, but feels like if it got worse, he would pass out. Not a spinning sensation. No associated chest pain, palpitations, racing heart, or shortness of breath. Mostly ocur at rest, but has also happened with activity such as walking. Not followed by or associated w headache. No numbness, tingling, or weakness.     Fam hx stroke in father -  at 56 of stroke?    Depression -   previously on meds, made him drowsy, stopped. Now, gets help w [employer], prayer, feels like he is managing symptoms and denies need for additional help or resources. Denies SI, overall feels he is doing well      ROS-  Frequent Bms, after every time he eats, not new for him. Sometimes runny. Lots of gas. Longstanding. Normal urine. No recent illness, no recent fevers.                 2024   General Health   How would you rate your overall physical health? Good            2024   Nutrition   Three or more servings of calcium each day? Yes   Diet: Regular (no restrictions)   How many servings of fruit and vegetables per day? (!) 0-1   How many sweetened beverages each day? (!) 2             No data to display                  2024   Social Factors   Worry food won't last until get money to buy more No   Food not last or not have enough money for food? No   Do you have housing? (Housing is defined as stable permanent housing and does not include staying ouside in a car, in a tent, in an abandoned building, in an overnight shelter, or couch-surfing.) Yes   Are you worried about losing your housing? No   Lack of transportation? No   Unable to get utilities (heat,electricity)? No            2024   Dental   Dentist two times every year? (!) NO            2024   TB Screening   Were you born outside of the [place]? Yes          Today's PHQ-9 Score:       2024    11:27 AM   PHQ-9 SCORE   PHQ-9 Total Score 1         2024   Substance Use   Alcohol more than 3/day or more than 7/wk No   Do you use any other substances  "recreationally? No        Social History     Tobacco Use    Smoking status: Some Days     Types: Cigarettes     Passive exposure: Never    Smokeless tobacco: Never   Vaping Use    Vaping status: Never Used   Substance Use Topics    Alcohol use: Yes     Alcohol/week: 2.0 standard drinks of alcohol    Drug use: No           9/19/2024   STI Screening   New sexual partner(s) since last STI/HIV test? (!) DECLINE      ASCVD Risk   The 10-year ASCVD risk score (Shar CRUZ, et al., 2019) is: 8%    Values used to calculate the score:      Age: 44 years      Sex: Male      Is Non- : No      Diabetic: No      Tobacco smoker: Yes      Systolic Blood Pressure: 122 mmHg      Is BP treated: No      HDL Cholesterol: 43 mg/dL      Total Cholesterol: 253 mg/dL        9/19/2024   Contraception/Family Planning   Questions about contraception or family planning No           Reviewed and updated as needed this visit by Provider   Tobacco  Allergies  Meds  Problems  Med Hx  Surg Hx  Fam Hx                     Objective    Exam  /88 (BP Location: Left arm, Patient Position: Sitting, Cuff Size: Adult Regular)   Pulse 66   Temp 98  F (36.7  C) (Oral)   Resp 20   Ht 1.72 m (5' 7.72\")   Wt 69.5 kg (153 lb 3.2 oz)   SpO2 98%   BMI 23.49 kg/m     Estimated body mass index is 23.49 kg/m  as calculated from the following:    Height as of this encounter: 1.72 m (5' 7.72\").    Weight as of this encounter: 69.5 kg (153 lb 3.2 oz).    Physical Exam  GENERAL: alert and no distress  EYES: Eyes grossly normal to inspection, PERRL and conjunctivae and sclerae normal, EOMI  HENT: ear canals and TM's normal, nose and mouth without ulcers or lesions  NECK: no adenopathy, no asymmetry, masses, or scars. No carotid bruit  RESP: lungs clear to auscultation - no rales, rhonchi or wheezes  CV: regular rate and rhythm, normal S1 S2, no S3 or S4, no murmur, click or rub, no peripheral edema  ABDOMEN: soft, " nontender, no hepatosplenomegaly, no masses and bowel sounds normal  MS: no gross musculoskeletal defects noted, no edema  Neuro: alert, oriented. CN 2-12 intact. Biceps, BR, achilles, patellar reflexes 2+ symmetric. Normal UE, LE light touch sensation. Strength grossly normal. Gait and speech normal.       Signed Electronically by: Susan Thomas MD

## 2024-09-27 ENCOUNTER — TELEPHONE (OUTPATIENT)
Dept: FAMILY MEDICINE | Facility: CLINIC | Age: 44
End: 2024-09-27
Payer: COMMERCIAL

## 2024-09-27 ENCOUNTER — APPOINTMENT (OUTPATIENT)
Dept: INTERPRETER SERVICES | Facility: CLINIC | Age: 44
End: 2024-09-27
Payer: COMMERCIAL

## 2024-09-27 NOTE — TELEPHONE ENCOUNTER
----- Message from Susan Thoams sent at 9/26/2024  5:38 PM CDT -----  Please call w results:  Cholesterol is high - he should try to exercise more and increase fruits and vegetables in the diet.  Avoid sugary drinks and desserts. Avoid fatty foods, especially fatty meats. Thyroid lab is borderline, and we can recheck it - schedule a follow up visit with me in about a month. We will call with other test results (heart tests and MRI) when we get them.

## 2024-09-27 NOTE — TELEPHONE ENCOUNTER
"Writer attempt #1 to call patient with the help of a \"Chaparrita\"  regarding clinician's message below. No answer, left non-detailed voicemail, with clinic call back number.     If patient / parent / family calls back, please relay clinician's message to them. Thanks.    CELIA ContiN, RN   [facility]    "

## 2024-09-30 ENCOUNTER — ANCILLARY PROCEDURE (OUTPATIENT)
Dept: MRI IMAGING | Facility: CLINIC | Age: 44
End: 2024-09-30
Attending: FAMILY MEDICINE
Payer: COMMERCIAL

## 2024-09-30 DIAGNOSIS — R42 LIGHTHEADEDNESS: ICD-10-CM

## 2024-09-30 RX ORDER — GADOBUTROL 604.72 MG/ML
3.5 INJECTION INTRAVENOUS ONCE
Status: COMPLETED | OUTPATIENT
Start: 2024-09-30 | End: 2024-09-30

## 2024-09-30 RX ADMIN — GADOBUTROL 3.5 ML: 604.72 INJECTION INTRAVENOUS at 14:12

## 2024-10-01 NOTE — TELEPHONE ENCOUNTER
"Writer attempt #2 to call patient with the help of a \"Chaparrita\"  (ID # [id number]) regarding clinician's message below. Clinician's message relayed to patient.    Assisted in scheduling patient for a follow-up visit for Dr. Thomas for:    Oct 21, 2024 11:20 AM  (Arrive by 11:00 AM)  Provider Visit with Susan Thomas MD  [facility] ([facility] [place] ) [phone]     Patient verbalizes understanding, agrees with plan and has no further questions.    CELIA ContiN, RN   [facility]    "

## 2024-10-02 NOTE — PROGRESS NOTES
Called pt to see if pt has already been seen with allergy clinic. Pt states he was seen on Mon Sep 23 rd at [facility] at the [facility].

## 2024-10-02 NOTE — LETTER
October 2, 2024  Re: Edmundo BROTHERS Philly  YOB: 1980    Dear Colleague,    Thank you for your referral to the [facility]. The patient was contacted and declined to schedule due to going to a [facility].      If you have any questions or concerns, please contact our office at Dept: [phone].        Sincerely,  [facility]

## 2024-10-07 ENCOUNTER — HOSPITAL ENCOUNTER (OUTPATIENT)
Dept: CARDIOLOGY | Facility: HOSPITAL | Age: 44
Discharge: HOME | End: 2024-10-07
Attending: FAMILY MEDICINE
Payer: COMMERCIAL

## 2024-10-07 ENCOUNTER — HOSPITAL ENCOUNTER (OUTPATIENT)
Dept: ULTRASOUND IMAGING | Facility: HOSPITAL | Age: 44
Discharge: HOME | End: 2024-10-07
Attending: FAMILY MEDICINE
Payer: COMMERCIAL

## 2024-10-07 DIAGNOSIS — R42 LIGHTHEADEDNESS: ICD-10-CM

## 2024-10-07 LAB — LVEF ECHO: NORMAL

## 2024-10-07 PROCEDURE — 93306 TTE W/DOPPLER COMPLETE: CPT | Mod: 26 | Performed by: GENERAL ACUTE CARE HOSPITAL

## 2024-10-07 PROCEDURE — 93880 EXTRACRANIAL BILAT STUDY: CPT

## 2024-10-07 PROCEDURE — 93306 TTE W/DOPPLER COMPLETE: CPT

## 2024-10-07 PROCEDURE — 93225 XTRNL ECG REC<48 HRS REC: CPT

## 2024-10-09 ENCOUNTER — TELEPHONE (OUTPATIENT)
Dept: FAMILY MEDICINE | Facility: CLINIC | Age: 44
End: 2024-10-09
Payer: COMMERCIAL

## 2024-10-09 NOTE — TELEPHONE ENCOUNTER
----- Message from Susan Thomas sent at 10/8/2024  3:54 PM CDT -----  Please call w results - MRI brain, carotid ultrasound, and echocardiogram:  MRI brain looks overall fine, but does have some changes that can be seen in people with migraines. I wonder if his vision symptoms and lightheadedness are a migraine or migraine variant. We can discuss this at his follow up visit.   Echocardiogram (ultrasound of the heart) is normal. Ultrasound of the neck arteries (carotids) shows a small amount of cholesterol build up, and I recommend a medication to lower his cholesterol - we can discuss this at his appt on 10/21.         Call placed to patient with assistance of an , Noel to relay provider test result and recommendations above.  Future appt with pcp reiterate.  Patient verbalized understood.    Trent Mo RN  [facility]

## 2024-10-12 ASSESSMENT — PATIENT HEALTH QUESTIONNAIRE - PHQ9: SUM OF ALL RESPONSES TO PHQ QUESTIONS 1-9: 1

## 2024-10-17 PROCEDURE — 93227 XTRNL ECG REC<48 HR R&I: CPT | Performed by: INTERNAL MEDICINE

## 2024-10-21 ENCOUNTER — OFFICE VISIT (OUTPATIENT)
Dept: FAMILY MEDICINE | Facility: CLINIC | Age: 44
End: 2024-10-21
Payer: COMMERCIAL

## 2024-10-21 VITALS
BODY MASS INDEX: 24.38 KG/M2 | HEIGHT: 67 IN | DIASTOLIC BLOOD PRESSURE: 72 MMHG | SYSTOLIC BLOOD PRESSURE: 114 MMHG | TEMPERATURE: 98.3 F | OXYGEN SATURATION: 98 % | RESPIRATION RATE: 20 BRPM | WEIGHT: 155.31 LBS | HEART RATE: 74 BPM

## 2024-10-21 DIAGNOSIS — R05.1 ACUTE COUGH: ICD-10-CM

## 2024-10-21 DIAGNOSIS — R79.89 ELEVATED TSH: ICD-10-CM

## 2024-10-21 DIAGNOSIS — H53.9 VISUAL AURA: ICD-10-CM

## 2024-10-21 DIAGNOSIS — E78.5 HYPERLIPIDEMIA LDL GOAL <130: Primary | ICD-10-CM

## 2024-10-21 PROCEDURE — 99214 OFFICE O/P EST MOD 30 MIN: CPT | Performed by: FAMILY MEDICINE

## 2024-10-21 PROCEDURE — T1013 SIGN LANG/ORAL INTERPRETER: HCPCS | Mod: U4

## 2024-10-21 RX ORDER — ATORVASTATIN CALCIUM 20 MG/1
20 TABLET, FILM COATED ORAL DAILY
Qty: 90 TABLET | Refills: 0 | Status: SHIPPED | OUTPATIENT
Start: 2024-10-21

## 2024-10-21 RX ORDER — BENZONATATE 100 MG/1
100 CAPSULE ORAL 3 TIMES DAILY PRN
Qty: 30 CAPSULE | Refills: 0 | Status: SHIPPED | OUTPATIENT
Start: 2024-10-21

## 2024-10-21 ASSESSMENT — ENCOUNTER SYMPTOMS: COUGH: 1

## 2024-10-21 NOTE — PROGRESS NOTES
Assessment & Plan     Elevated TSH  Will recheck in 4-6 weeks when he will be due for labs after starting statin  - TSH with free T4 reflex    Hyperlipidemia LDL goal <130  Agrees to start atorvastatin. Labs in 4-6 weeks.   - atorvastatin (LIPITOR) 20 MG tablet  Dispense: 90 tablet; Refill: 0  - Lipid panel  - Hepatic panel (Albumin, ALT, AST, Bili, Alk Phos, TP)    Acute cough, viral URI  Reassuring exam today. He is interested in cough suppressant - rx as below. Follow up if worsening or new symptoms such as SOB pr fever.   - benzonatate (TESSALON) 100 MG capsule  Dispense: 30 capsule; Refill: 0    Visual aura without migraine  Recommend he try ibuprofen 600mg at earliest sign that he is having an episode. If this does not work, or if he starts having these more frequently again (currently has not had one in the past month), recommend neuro consult.         Beni Wyatt is a 44 year old, presenting for the following health issues:  Thyroid Problem and Cough (Dry cough, felt low temp. Would like meds for that. Nyquil/Dayquil is not helping )        10/21/2024    11:06 AM   Additional Questions   Roomed by Barb BROTHERS   Accompanied by self     Follow up from previous visit 9/19 and imaging results:  Needs TSH recheck, discuss possible migraines, start cholesterol medication    Today 10/21:  Willing to start cholesterol medication    Lightheaded spells with vision changes - after MRI showed changes that can be associated w migraines, further discussion with patient reveals that patient gets visual symptoms that are consistent with migraine aura, but does not really get headache. More likely to occur if not getting enough sleep. Has not had any episodes since his last visit. Patient states he can get these symptoms a few times a weeks, and then will go away for weeks. Patient reports eye exam recently (almost one month ago) - given glasses for near vision. Eyes otherwise healthy per patient.     Cough - started 6  "days ago, dry cough, worse at night. Sore throat. Possible subjective fever in past couple of days based on achy body - did not take temp. No SOB. Nyquil/dayquil not helping.                     Objective    /72   Pulse 74   Temp 98.3  F (36.8  C) (Oral)   Resp 20   Ht 1.712 m (5' 7.4\")   Wt 70.4 kg (155 lb 5 oz)   SpO2 98%   BMI 24.04 kg/m    Body mass index is 24.04 kg/m .  Physical Exam   GENERAL: alert and no distress  EYES: Eyes grossly normal to inspection, PERRL and conjunctivae and sclerae normal  HENT: ear canals and TM's normal, nose and mouth without ulcers or lesions  NECK: no adenopathy, no asymmetry, masses, or scars  RESP: lungs clear to auscultation - no rales, rhonchi or wheezes  CV: regular rate and rhythm, normal S1 S2, no S3 or S4, no murmur, click or rub, no peripheral edema  MS: no gross musculoskeletal defects noted, no edema            Signed Electronically by: Susan Thomas MD    "

## 2024-11-18 ENCOUNTER — LAB (OUTPATIENT)
Dept: LAB | Facility: CLINIC | Age: 44
End: 2024-11-18
Payer: COMMERCIAL

## 2024-11-18 DIAGNOSIS — R79.89 ELEVATED TSH: ICD-10-CM

## 2024-11-18 DIAGNOSIS — E78.5 HYPERLIPIDEMIA LDL GOAL <130: ICD-10-CM

## 2024-11-18 LAB
ALBUMIN SERPL BCG-MCNC: 4.4 G/DL (ref 3.5–5.2)
ALP SERPL-CCNC: 82 U/L (ref 40–150)
ALT SERPL W P-5'-P-CCNC: 72 U/L (ref 0–70)
AST SERPL W P-5'-P-CCNC: 28 U/L (ref 0–45)
BILIRUB DIRECT SERPL-MCNC: 0.2 MG/DL (ref 0–0.3)
BILIRUB SERPL-MCNC: 1 MG/DL
CHOLEST SERPL-MCNC: 187 MG/DL
FASTING STATUS PATIENT QL REPORTED: ABNORMAL
HDLC SERPL-MCNC: 38 MG/DL
LDLC SERPL CALC-MCNC: 98 MG/DL
NONHDLC SERPL-MCNC: 149 MG/DL
PROT SERPL-MCNC: 7.2 G/DL (ref 6.4–8.3)
TRIGL SERPL-MCNC: 256 MG/DL
TSH SERPL DL<=0.005 MIU/L-ACNC: 3.97 UIU/ML (ref 0.3–4.2)

## 2024-11-18 PROCEDURE — 36415 COLL VENOUS BLD VENIPUNCTURE: CPT

## 2024-11-18 PROCEDURE — 84443 ASSAY THYROID STIM HORMONE: CPT

## 2024-11-18 PROCEDURE — 80061 LIPID PANEL: CPT

## 2024-11-18 PROCEDURE — 80076 HEPATIC FUNCTION PANEL: CPT

## 2024-11-25 DIAGNOSIS — E78.5 HYPERLIPIDEMIA LDL GOAL <130: ICD-10-CM

## 2024-11-25 RX ORDER — ATORVASTATIN CALCIUM 20 MG/1
20 TABLET, FILM COATED ORAL DAILY
Qty: 90 TABLET | Refills: 3 | Status: SHIPPED | OUTPATIENT
Start: 2024-11-25

## 2024-11-26 ENCOUNTER — TELEPHONE (OUTPATIENT)
Dept: FAMILY MEDICINE | Facility: CLINIC | Age: 44
End: 2024-11-26
Payer: COMMERCIAL

## 2024-11-26 NOTE — LETTER
"December 2, 2024      Edmundo DENEEN Fraga  [address] [place] [address]        Dear ,    We are writing to inform you of your test results.    Your test results fall within the expected range(s) or remain unchanged from previous results.  Please continue with current treatment plan.    \" Please call w results -   Thyroid lab is now back to normal.  Cholesterol has improved since starting atorvastatin. Continue current dose - refills sent. \"    Resulted Orders   Lipid panel   Result Value Ref Range    Cholesterol 187 <200 mg/dL    Triglycerides 256 (H) <150 mg/dL    Direct Measure HDL 38 (L) >=40 mg/dL    LDL Cholesterol Calculated 98 <100 mg/dL    Non HDL Cholesterol 149 (H) <130 mg/dL    Patient Fasting > 8hrs? Unknown     Narrative    Cholesterol  Desirable: < 200 mg/dL  Borderline High: 200 - 239 mg/dL  High: >= 240 mg/dL    Triglycerides  Normal: < 150 mg/dL  Borderline High: 150 - 199 mg/dL  High: 200-499 mg/dL  Very High: >= 500 mg/dL    Direct Measure HDL  Female: >= 50 mg/dL   Male: >= 40 mg/dL    LDL Cholesterol  Desirable: < 100 mg/dL  Above Desirable: 100 - 129 mg/dL   Borderline High: 130 - 159 mg/dL   High:  160 - 189 mg/dL   Very High: >= 190 mg/dL    Non HDL Cholesterol  Desirable: < 130 mg/dL  Above Desirable: 130 - 159 mg/dL  Borderline High: 160 - 189 mg/dL  High: 190 - 219 mg/dL  Very High: >= 220 mg/dL   Hepatic panel (Albumin, ALT, AST, Bili, Alk Phos, TP)   Result Value Ref Range    Protein Total 7.2 6.4 - 8.3 g/dL    Albumin 4.4 3.5 - 5.2 g/dL    Bilirubin Total 1.0 <=1.2 mg/dL    Alkaline Phosphatase 82 40 - 150 U/L    AST 28 0 - 45 U/L    ALT 72 (H) 0 - 70 U/L    Bilirubin Direct 0.20 0.00 - 0.30 mg/dL   TSH with free T4 reflex   Result Value Ref Range    TSH 3.97 0.30 - 4.20 uIU/mL       If you have any questions or concerns, please call the clinic at the number listed above.       Sincerely,          Dr. Thomas              "

## 2024-11-26 NOTE — TELEPHONE ENCOUNTER
1st attempt. Called patient and could not reach anyone. Voicemail box has not been set up yet.      Madhu Sams Cem Say, BSN RN  [facility]

## 2024-11-26 NOTE — TELEPHONE ENCOUNTER
----- Message from Susan Thomas sent at 11/25/2024  5:49 PM CST -----  Please call w results -   Thyroid lab is now back to normal.  Cholesterol has improved since starting atorvastatin. Continue current dose - refills sent.

## 2024-11-29 NOTE — TELEPHONE ENCOUNTER
Attempt #2. No answer. VM not set up, unable to LVM. Please relay clinician's message below if pt calls back.    Yannick RUSSO RN  [facility]

## 2024-12-02 NOTE — TELEPHONE ENCOUNTER
"Writer attempt #3 to call patient with the help of a  [facility]\"Chaparrita\"   regarding clinician's message below. No answer, unable to leave VM.    If patient calls back, please relay clinician's message to them. Thanks.    Third attempt, letter will be sent.     Closing encounter.    CELIA ContiN, RN, N   [facility]    "

## 2025-08-20 ENCOUNTER — PATIENT OUTREACH (OUTPATIENT)
Dept: CARE COORDINATION | Facility: CLINIC | Age: 45
End: 2025-08-20
Payer: COMMERCIAL

## 2025-08-24 ENCOUNTER — OFFICE VISIT (OUTPATIENT)
Dept: URGENT CARE | Facility: URGENT CARE | Age: 45
End: 2025-08-24
Payer: COMMERCIAL

## 2025-08-24 ENCOUNTER — HOSPITAL ENCOUNTER (EMERGENCY)
Facility: HOSPITAL | Age: 45
Discharge: HOME | End: 2025-08-24
Attending: EMERGENCY MEDICINE | Admitting: EMERGENCY MEDICINE
Payer: COMMERCIAL

## 2025-08-24 VITALS
DIASTOLIC BLOOD PRESSURE: 76 MMHG | OXYGEN SATURATION: 99 % | SYSTOLIC BLOOD PRESSURE: 110 MMHG | RESPIRATION RATE: 16 BRPM | TEMPERATURE: 98.1 F | BODY MASS INDEX: 24.04 KG/M2 | HEART RATE: 58 BPM | WEIGHT: 153.2 LBS | HEIGHT: 67 IN

## 2025-08-24 VITALS
TEMPERATURE: 98.3 F | BODY MASS INDEX: 23.69 KG/M2 | DIASTOLIC BLOOD PRESSURE: 77 MMHG | SYSTOLIC BLOOD PRESSURE: 114 MMHG | WEIGHT: 153.1 LBS | HEART RATE: 67 BPM | OXYGEN SATURATION: 99 % | RESPIRATION RATE: 18 BRPM

## 2025-08-24 DIAGNOSIS — H53.2 BINOCULAR VISION DISORDER WITH DIPLOPIA: Primary | ICD-10-CM

## 2025-08-24 DIAGNOSIS — G43.109 OCULAR MIGRAINE: Primary | ICD-10-CM

## 2025-08-24 DIAGNOSIS — G43.009 ATYPICAL MIGRAINE: ICD-10-CM

## 2025-08-24 DIAGNOSIS — H53.9 VISION CHANGES: ICD-10-CM

## 2025-08-24 LAB — GLUCOSE BLDC GLUCOMTR-MCNC: 84 MG/DL (ref 70–99)

## 2025-08-24 PROCEDURE — 3078F DIAST BP <80 MM HG: CPT | Performed by: EMERGENCY MEDICINE

## 2025-08-24 PROCEDURE — 250N000011 HC RX IP 250 OP 636

## 2025-08-24 PROCEDURE — 99284 EMERGENCY DEPT VISIT MOD MDM: CPT | Mod: 25 | Performed by: EMERGENCY MEDICINE

## 2025-08-24 PROCEDURE — 250N000013 HC RX MED GY IP 250 OP 250 PS 637: Performed by: EMERGENCY MEDICINE

## 2025-08-24 PROCEDURE — 96361 HYDRATE IV INFUSION ADD-ON: CPT

## 2025-08-24 PROCEDURE — 3074F SYST BP LT 130 MM HG: CPT | Performed by: EMERGENCY MEDICINE

## 2025-08-24 PROCEDURE — 258N000003 HC RX IP 258 OP 636

## 2025-08-24 PROCEDURE — 96374 THER/PROPH/DIAG INJ IV PUSH: CPT

## 2025-08-24 PROCEDURE — 82962 GLUCOSE BLOOD TEST: CPT

## 2025-08-24 PROCEDURE — 96375 TX/PRO/DX INJ NEW DRUG ADDON: CPT

## 2025-08-24 PROCEDURE — 99215 OFFICE O/P EST HI 40 MIN: CPT | Performed by: EMERGENCY MEDICINE

## 2025-08-24 RX ORDER — OLANZAPINE 5 MG/1
5 TABLET, ORALLY DISINTEGRATING ORAL AT BEDTIME
Status: DISCONTINUED | OUTPATIENT
Start: 2025-08-24 | End: 2025-08-24

## 2025-08-24 RX ORDER — OLANZAPINE 5 MG/1
5 TABLET, ORALLY DISINTEGRATING ORAL ONCE
Status: COMPLETED | OUTPATIENT
Start: 2025-08-24 | End: 2025-08-24

## 2025-08-24 RX ORDER — DIPHENHYDRAMINE HYDROCHLORIDE 50 MG/ML
25 INJECTION, SOLUTION INTRAMUSCULAR; INTRAVENOUS ONCE
Status: COMPLETED | OUTPATIENT
Start: 2025-08-24 | End: 2025-08-24

## 2025-08-24 RX ORDER — KETOROLAC TROMETHAMINE 15 MG/ML
15 INJECTION, SOLUTION INTRAMUSCULAR; INTRAVENOUS ONCE
Status: COMPLETED | OUTPATIENT
Start: 2025-08-24 | End: 2025-08-24

## 2025-08-24 RX ORDER — METOCLOPRAMIDE HYDROCHLORIDE 5 MG/ML
10 INJECTION INTRAMUSCULAR; INTRAVENOUS ONCE
Status: COMPLETED | OUTPATIENT
Start: 2025-08-24 | End: 2025-08-24

## 2025-08-24 RX ADMIN — METOCLOPRAMIDE 10 MG: 5 INJECTION, SOLUTION INTRAMUSCULAR; INTRAVENOUS at 15:04

## 2025-08-24 RX ADMIN — KETOROLAC TROMETHAMINE 15 MG: 15 INJECTION, SOLUTION INTRAMUSCULAR; INTRAVENOUS at 15:04

## 2025-08-24 RX ADMIN — DIPHENHYDRAMINE HYDROCHLORIDE 25 MG: 50 INJECTION, SOLUTION INTRAMUSCULAR; INTRAVENOUS at 15:05

## 2025-08-24 RX ADMIN — SODIUM CHLORIDE 1000 ML: 0.9 INJECTION, SOLUTION INTRAVENOUS at 15:02

## 2025-08-24 RX ADMIN — OLANZAPINE 5 MG: 5 TABLET, ORALLY DISINTEGRATING ORAL at 16:10

## 2025-08-24 ASSESSMENT — ACTIVITIES OF DAILY LIVING (ADL)
ADLS_ACUITY_SCORE: 41
ADLS_ACUITY_SCORE: 41

## 2025-08-24 ASSESSMENT — COLUMBIA-SUICIDE SEVERITY RATING SCALE - C-SSRS
6. HAVE YOU EVER DONE ANYTHING, STARTED TO DO ANYTHING, OR PREPARED TO DO ANYTHING TO END YOUR LIFE?: NO
1. IN THE PAST MONTH, HAVE YOU WISHED YOU WERE DEAD OR WISHED YOU COULD GO TO SLEEP AND NOT WAKE UP?: NO
2. HAVE YOU ACTUALLY HAD ANY THOUGHTS OF KILLING YOURSELF IN THE PAST MONTH?: NO

## 2025-08-25 ENCOUNTER — PATIENT OUTREACH (OUTPATIENT)
Dept: CARE COORDINATION | Facility: CLINIC | Age: 45
End: 2025-08-25
Payer: COMMERCIAL

## 2025-08-26 ENCOUNTER — PATIENT OUTREACH (OUTPATIENT)
Dept: CARE COORDINATION | Facility: CLINIC | Age: 45
End: 2025-08-26
Payer: COMMERCIAL

## 2025-09-03 ENCOUNTER — PATIENT OUTREACH (OUTPATIENT)
Dept: CARE COORDINATION | Facility: CLINIC | Age: 45
End: 2025-09-03
Payer: COMMERCIAL